# Patient Record
Sex: FEMALE | Race: OTHER | NOT HISPANIC OR LATINO | Employment: UNEMPLOYED | URBAN - METROPOLITAN AREA
[De-identification: names, ages, dates, MRNs, and addresses within clinical notes are randomized per-mention and may not be internally consistent; named-entity substitution may affect disease eponyms.]

---

## 2019-11-01 LAB — POC BETA-HCG (QUAL): NEGATIVE

## 2020-02-05 ENCOUNTER — HISTORICAL (OUTPATIENT)
Dept: ADMINISTRATIVE | Facility: HOSPITAL | Age: 50
End: 2020-02-05

## 2020-02-05 LAB
ABS NEUT (OLG): 4.7 X10(3)/MCL (ref 2.1–9.2)
ALBUMIN SERPL-MCNC: 3.8 GM/DL (ref 3.4–5)
ALBUMIN/GLOB SERPL: 1.2 RATIO (ref 1.1–2)
ALP SERPL-CCNC: 69 UNIT/L (ref 45–117)
ALT SERPL-CCNC: 26 UNIT/L (ref 12–78)
AST SERPL-CCNC: 16 UNIT/L (ref 15–37)
BASOPHILS # BLD AUTO: 0 X10(3)/MCL (ref 0–0.2)
BASOPHILS NFR BLD AUTO: 1 %
BILIRUB SERPL-MCNC: 0.3 MG/DL (ref 0.2–1)
BILIRUBIN DIRECT+TOT PNL SERPL-MCNC: 0.1 MG/DL (ref 0–0.2)
BILIRUBIN DIRECT+TOT PNL SERPL-MCNC: 0.2 MG/DL
BUN SERPL-MCNC: 25 MG/DL (ref 7–18)
CALCIUM SERPL-MCNC: 8.4 MG/DL (ref 8.5–10.1)
CHLORIDE SERPL-SCNC: 107 MMOL/L (ref 98–107)
CO2 SERPL-SCNC: 29 MMOL/L (ref 21–32)
CREAT SERPL-MCNC: 0.9 MG/DL (ref 0.6–1.3)
EOSINOPHIL # BLD AUTO: 0.2 X10(3)/MCL (ref 0–0.9)
EOSINOPHIL NFR BLD AUTO: 2 %
ERYTHROCYTE [DISTWIDTH] IN BLOOD BY AUTOMATED COUNT: 12.2 % (ref 11.5–14.5)
GLOBULIN SER-MCNC: 3.3 GM/ML (ref 2.3–3.5)
GLUCOSE SERPL-MCNC: 91 MG/DL (ref 74–106)
HCT VFR BLD AUTO: 39.1 % (ref 35–46)
HGB BLD-MCNC: 12.7 GM/DL (ref 12–16)
IMM GRANULOCYTES # BLD AUTO: 0.01 10*3/UL
IMM GRANULOCYTES NFR BLD AUTO: 0 %
LYMPHOCYTES # BLD AUTO: 2.6 X10(3)/MCL (ref 0.6–4.6)
LYMPHOCYTES NFR BLD AUTO: 32 %
MCH RBC QN AUTO: 29.3 PG (ref 26–34)
MCHC RBC AUTO-ENTMCNC: 32.5 GM/DL (ref 31–37)
MCV RBC AUTO: 90.1 FL (ref 80–100)
MONOCYTES # BLD AUTO: 0.6 X10(3)/MCL (ref 0.1–1.3)
MONOCYTES NFR BLD AUTO: 8 %
NEUTROPHILS # BLD AUTO: 4.7 X10(3)/MCL (ref 2.1–9.2)
NEUTROPHILS NFR BLD AUTO: 58 %
PLATELET # BLD AUTO: 228 X10(3)/MCL (ref 130–400)
PMV BLD AUTO: 9.9 FL (ref 7.4–10.4)
POTASSIUM SERPL-SCNC: 3.7 MMOL/L (ref 3.5–5.1)
PROT SERPL-MCNC: 7.1 GM/DL (ref 6.4–8.2)
RBC # BLD AUTO: 4.34 X10(6)/MCL (ref 4–5.2)
SODIUM SERPL-SCNC: 140 MMOL/L (ref 136–145)
WBC # SPEC AUTO: 8.2 X10(3)/MCL (ref 4.5–11)

## 2020-02-11 ENCOUNTER — HISTORICAL (OUTPATIENT)
Dept: ADMINISTRATIVE | Facility: HOSPITAL | Age: 50
End: 2020-02-11

## 2020-02-13 ENCOUNTER — HISTORICAL (OUTPATIENT)
Dept: SURGERY | Facility: HOSPITAL | Age: 50
End: 2020-02-13

## 2022-04-11 ENCOUNTER — HISTORICAL (OUTPATIENT)
Dept: ADMINISTRATIVE | Facility: HOSPITAL | Age: 52
End: 2022-04-11

## 2022-04-28 VITALS
WEIGHT: 180.13 LBS | DIASTOLIC BLOOD PRESSURE: 77 MMHG | SYSTOLIC BLOOD PRESSURE: 138 MMHG | BODY MASS INDEX: 35.37 KG/M2 | HEIGHT: 60 IN | OXYGEN SATURATION: 98 %

## 2022-05-04 NOTE — HISTORICAL [facility] CERNER
This is a historical note converted from [employer]ner. Formatting and pictures may have been removed.  Please reference [employer] for original formatting and attached multimedia. Indication for Surgery  s/p LEEP with HSIL at the margins  Preoperative Diagnosis  Cervical Dysplasia  Postoperative Diagnosis  Same as above  Operation  Cold Knife Cone  Surgeon(s)  MD GUERA Sesay MD - Attending  Anesthesia  General Endotracheal  Estimated Blood Loss  5 mL  Urine Output  20 mL  ?  IVFs  300 mL  Findings  EUA: Normal external female genitalia. Moist pink vaginal mucosa. Normal appearing?cervix without lesions or masses. Paragard IUD strings noted?in the os.?Lugols solution was placed on the cervix and no lesions were visualized. 8 week size anteverted uterus, mobile, good descent, adequate vaginal capacity. No adnexal masses palpated.?  Specimen(s)  Conization of the Cervix  Endocervical Curettage  Complications  None  Technique  The patient was then brought to the operating room with IVFs running and SCDs in place. General anesthesia was obtained and found to be adequate. The patient was then prepped and draped in the normal sterile fashion in the dorsal supine position with Ravinder type stirrups.?  The cervix was visualized. Lugols solution was placed on the cervix and no lesions were visualized. The anterior lip of the cervix was then grasped with a single-tooth tenaculum. A paracervical block was performed using?8 mL of vasopressin placed 4:00 and 8:00. Two stay sutures were placed using 0 Vicryl at the 3 and 9 oclock positions. A hollow?spinal needle cap was placed over the IUD strings for protection. An 11 blade was used to obtain the cone specimen starting at the 6 oclock position and moving in a counter-clockwise manner.  The cervical cone specimen was then amputated and tagged with silk suture at the?6 oclock position. At this point an endocervical curettage was performed. Both specimens were then  labeled and sent for pathology. Hemostasis was achieved using the Bovie with a roller ball tip.?IUD strings remained visible in the os. Monsels was then placed in the surgical bed, over which the two stay sutures were tied. The patient tolerated the procedure well. The patient was then extubated and awakened from anesthesia and brought to the recovery room in stable condition.  ?  ?Javier?was present for the entire procedure.  ?   Hunter Mack MD PGY2?  [facility]?   This certifies I was present during the entire period between opening and closing of the surgical field.

## 2022-05-04 NOTE — HISTORICAL [facility] CERNER
This is a historical note converted from [employer]. Formatting and pictures may have been removed.  Please reference Kamran for original formatting and attached multimedia. H&P Interval?Update  ?   50?yo  presents for scheduledcold knife cervical conization secondary to cervical dysplasia.  ?   She is able to describe planned procedure in her own words.  No new medical problems or medications.  Last ate or drank at 1600 yesterday  ?   After procedure we have her permission to contact?her Daughter Suyapa Guaman?at [phone]  ?  ?Event Name? Event Result? Date/Time?   Temperature Oral 36.8 DegC 20 09:00:00   Peripheral Pulse Rate 65 bpm 20 10:00:00   Peripheral Pulse Rate 68 bpm 20 09:00:00   Respiratory Rate 18 br/min 20 10:00:00   Respiratory Rate 20 br/min 20 09:00:00   SpO2 98 % 20 10:00:00   SpO2 98 % 20 09:00:00   Systolic Blood Pressure 142 mmHg?High 20 10:00:00   Systolic Blood Pressure 133 mmHg 20 09:00:00   Diastolic Blood Pressure 73 mmHg 20 10:00:00   Diastolic Blood Pressure 79 mmHg 20 09:00:00   Mean Arterial Pressure, Cuff 96 mmHg 20 10:00:00   Mean Arterial Pressure, Cuff 97 mmHg 20 09:00:00   ?  GEN: NAD, A&O  Card: RRR  Pulm: CTAB  Abdomen: Soft, non-tender, no rebound or guarding  Ext: Calves?non-tender bilaterally  ?   UPT Negative  ?   Hunter Mack, HO-II  [facility]  ?  Chief Complaint  Preop  ?  History of Present Illness  50?yo  presents for preop for cold knife cervical conization secondary to cervical dysplasia. Patient had high grade dysplasia with positive margins after LEEP.? She missed her?follow-up visit to discuss the results of her LEEP?because the phone number in power chart?was not?accurate.? She has no other complaints today.? She is sexually active, and has a ParaGard IUD?for contraception.  ?  OBHx: FT  x3  GYNHx: LMP 2019, 13/reg/3, denies h/o STIs  SocialHx: Clean houses. Denies  alcohol, tobacco and illicit drugs  Review of Systems  Positive in Bold  General ROS: chills, fatigue or fever; no dizziness, weakness and fatigue  Psychological ROS: anxiety, behavioral disorder or concentration difficulties, depression  Endocrine ROS: breast changes, hot flashes or mood swings  Breast ROS: nipple changes or nipple discharge  Respiratory ROS: cough, hemoptysis or orthopnea  Cardiovascular ROS: edema, irregular heartbeat or murmur  Gastrointestinal ROS: abdominal pain, appetite loss, blood in stools, constipation or diarrhea  Genito-Urinary ROS: genital discharge, genital ulcers or hematuria, + pressure, abnormal uterine bleeding, dysmenorrhea  Musculoskeletal ROS: gait disturbance  Neurological ROS: confusion or dizziness  Dermatological ROS: acne, dry skin and eczema  ?  Physical Exam  ???Vitals & Measurements  ??  ??T: 36 HR: 97 BP: 164/97 SPO2: 99% on RA  General: NAD, A/Ox3. Well appearing.  Respiratory: CTAB, no wheezes, rales, or rhonchi  Cardiovascular: RRR no murmurs, rubs, or gallops  Abdomen: soft, nondistended, nontender to palpation, no hepatosplenomegaly, no masses palpated, normoactive bowel sounds  Extremities: no edema, no calf tenderness  External genitalia: Normal female anatomy, no masses/lesions. Normal appearing urethral meatus. Normal appearing external anus.  Speculum exam: vaginal mucosa normal in appearance. Pink. No masses/lesions. Cervix well visualized with white IUD strings through os, smooth in contour no masses or lesions. Os normal in appearance, no blood or discharge coming from the os.  Bimanual exam: Vagina with good capacity. Uterus 8cm in size, no cervical motion tenderness. Smooth in contour, no masses. Good descent, mobile, not broad. No adnexal fullness/tenderness. Normal urethra. Normal bladder.  Assessment/Plan  ?  50?yo?with?high grade cervical dysplasia  ?  Counseling:  ?  Discussed results of LEEP with patient.? Counseled her that she  has?high-grade?premalignant?cells?extending to the surgical margins of the?removed surgical tissue.? Therefore I recommend proceeding with cold knife conization?for?more precise surgical margins. We discussed the risks of Cold Knife Conization including but are not limited to visceral or vascular injury, including injury to bowel and bladder, postoperative pain, bleeding including hemorrhage requiring transfusion, and infection. She agrees to blood transfusion in case of health or life threatening blood loss. She understands that there is an increased risk to future attempts at conception/pregnancy of cervical stenosis, cervical incompetence leading to  labor or  premature rupture of membranes. We discussed that there is the risk that not all of the abnormal cells are removed, and the possible need for repeat excision or hysterectomy if repeat excision is not feasible. She is ultimately interested in a hysterectomy, but she understands that?we must complete a CKC first.  ?  [facility] appointment requested.  Labs today: CBC, CMP  Labs DOS: T&S, UPT  Meds DOS: None  Caprini score?3; SCDs for DVT prophylaxis  No abx  Discussed outpatient surgery  ?   #[id number] used for entire visit  ?  To OR for?cold knife conization on 20.  ?  Oziel Schuster, HO-III  [facility] Problem List/Past Medical History  Ongoing  ??HTN (hypertension)  ?Obesity  Procedure/Surgical History  ?none   ?  Medications  ?hydroCHLOROthiazide 12.5 mg oral capsule, 12.5 mg= 1 cap(s), Oral, Daily  ?lisinopril 20 mg oral tablet, 20 mg= 1 tab(s), Oral, Daily  Allergies  No Known Allergies  ?Marital Status  ?Single  Family History  ?Diabetes mellitus type 1.: Mother.  ?Hypertension.: Mother.  ?  Lab Results  ?  Final Diagnosis?(Verified) 2014  NEGATIVE FOR INTRAEPITHELIAL LESION OR MALIGNANCY. NIL  Additional Findings 1?(Verified)  Endometrial cells present with no LMP history in a woman 40 and over.  Additional Findings  2?(Verified)  Bacteria, blood, ?and PMNs present.  ?  Colpo 11/2019  Final Diagnosis?(Verified)  ?  1. Endocervix, Curettage:  - Rare fragment of squamous epithelium with high grade dysplasia admixed with multiple fragments of benign endocervical tissue.  ?  2. Endometrium, Biopsy:  - Benign endometrium with progestin effect..  - No evidence of atypia or malignancy.  ?  LEEP 12/2019  Final Diagnosis?(Verified)  ?  1. Cervix, Posterior pass:?  - High grade (severe) squamous dysplasia with endocervical glandular extension.  - The high grade dysplasia is present at the surgical margin.  ?  2. Cervix, Anterior pass:  - High grade (severe) squamous dysplasia with endocervical glandular extension.  - The high grade dysplasia is present at the surgical margin. [1]  ?  ?   ?  [1]?SURGICAL PATH FINAL REPORT; 12/20/2019 12:44 CST  ?  Addendum by Javier WEBB, May S on February 07, 2020 10:09:42 CST (Verified)  I have seen this patient and agree with note above.   used for entire counseling with me as well.? Patient referred to financial office following this visit- I was clear that we will do this surgery regardless given risk of underlying early malignancy or risk of progression to malignancy in this setting.? She expressed understanding.? We offered to speak with her daughter and she declined- she states that she will tell her daughter the information regarding her surgery/risks, etc.  Informed consent obtained, specifically I reiterated the risks of: infection, bleeding, damage to nearby organs, deep cone with excess bleeding requiring emergent hysterectomy, return to operating room for problems, finding of malignancy, anesthetic risks, or other unpredicted risks.??  Patient able to describe planned procedure in her own words.  Expected recovery time reviewed, as well as normal postoperative course.   I have seen the patient today preoperatively and she is able to state procedure in her own words.?   used for counseling today.? She inquires if cervix will be completely removed, which we discussed it would not.? I explained that we can consider hysterectomy at a later date after pathology is finalized, and she would like this.? She states she does not want the abnormal cervical cells coming back.  ?

## 2022-09-21 ENCOUNTER — HISTORICAL (OUTPATIENT)
Dept: ADMINISTRATIVE | Facility: HOSPITAL | Age: 52
End: 2022-09-21

## 2023-06-09 ENCOUNTER — HOSPITAL ENCOUNTER (EMERGENCY)
Facility: HOSPITAL | Age: 53
Discharge: HOME | End: 2023-06-09
Attending: STUDENT IN AN ORGANIZED HEALTH CARE EDUCATION/TRAINING PROGRAM
Payer: MEDICAID

## 2023-06-09 VITALS
RESPIRATION RATE: 14 BRPM | DIASTOLIC BLOOD PRESSURE: 71 MMHG | OXYGEN SATURATION: 97 % | TEMPERATURE: 98 F | HEART RATE: 78 BPM | SYSTOLIC BLOOD PRESSURE: 133 MMHG

## 2023-06-09 DIAGNOSIS — L30.9 DERMATITIS: ICD-10-CM

## 2023-06-09 DIAGNOSIS — R42 DIZZINESS: ICD-10-CM

## 2023-06-09 DIAGNOSIS — N39.0 URINARY TRACT INFECTION WITHOUT HEMATURIA, SITE UNSPECIFIED: Primary | ICD-10-CM

## 2023-06-09 LAB
ALBUMIN SERPL-MCNC: 4.1 G/DL (ref 3.5–5)
ALBUMIN/GLOB SERPL: 1.4 RATIO (ref 1.1–2)
ALP SERPL-CCNC: 57 UNIT/L (ref 40–150)
ALT SERPL-CCNC: 28 UNIT/L (ref 0–55)
APPEARANCE UR: CLEAR
AST SERPL-CCNC: 17 UNIT/L (ref 5–34)
BACTERIA #/AREA URNS AUTO: ABNORMAL /HPF
BASOPHILS # BLD AUTO: 0.05 X10(3)/MCL
BASOPHILS NFR BLD AUTO: 0.8 %
BILIRUB UR QL STRIP.AUTO: NEGATIVE MG/DL
BILIRUBIN DIRECT+TOT PNL SERPL-MCNC: 0.2 MG/DL
BUN SERPL-MCNC: 23.7 MG/DL (ref 9.8–20.1)
CALCIUM SERPL-MCNC: 9.6 MG/DL (ref 8.4–10.2)
CHLORIDE SERPL-SCNC: 104 MMOL/L (ref 98–107)
CO2 SERPL-SCNC: 29 MMOL/L (ref 22–29)
COLOR UR: YELLOW
CREAT SERPL-MCNC: 0.99 MG/DL (ref 0.55–1.02)
EOSINOPHIL # BLD AUTO: 0.16 X10(3)/MCL (ref 0–0.9)
EOSINOPHIL NFR BLD AUTO: 2.4 %
ERYTHROCYTE [DISTWIDTH] IN BLOOD BY AUTOMATED COUNT: 11.9 % (ref 11.5–17)
GFR SERPLBLD CREATININE-BSD FMLA CKD-EPI: >60 MLS/MIN/1.73/M2
GLOBULIN SER-MCNC: 3 GM/DL (ref 2.4–3.5)
GLUCOSE SERPL-MCNC: 290 MG/DL (ref 74–100)
GLUCOSE UR QL STRIP.AUTO: ABNORMAL MG/DL
HCT VFR BLD AUTO: 39.2 % (ref 37–47)
HGB BLD-MCNC: 13 G/DL (ref 12–16)
IMM GRANULOCYTES # BLD AUTO: 0.01 X10(3)/MCL (ref 0–0.04)
IMM GRANULOCYTES NFR BLD AUTO: 0.2 %
KETONES UR QL STRIP.AUTO: NEGATIVE MG/DL
LEUKOCYTE ESTERASE UR QL STRIP.AUTO: ABNORMAL UNIT/L
LYMPHOCYTES # BLD AUTO: 2.75 X10(3)/MCL (ref 0.6–4.6)
LYMPHOCYTES NFR BLD AUTO: 42 %
MCH RBC QN AUTO: 28.3 PG (ref 27–31)
MCHC RBC AUTO-ENTMCNC: 33.2 G/DL (ref 33–36)
MCV RBC AUTO: 85.2 FL (ref 80–94)
MONOCYTES # BLD AUTO: 0.5 X10(3)/MCL (ref 0.1–1.3)
MONOCYTES NFR BLD AUTO: 7.6 %
NEUTROPHILS # BLD AUTO: 3.07 X10(3)/MCL (ref 2.1–9.2)
NEUTROPHILS NFR BLD AUTO: 47 %
NITRITE UR QL STRIP.AUTO: NEGATIVE
NRBC BLD AUTO-RTO: 0 %
PH UR STRIP.AUTO: 6 [PH]
PLATELET # BLD AUTO: 321 X10(3)/MCL (ref 130–400)
PMV BLD AUTO: 10.4 FL (ref 7.4–10.4)
POTASSIUM SERPL-SCNC: 4 MMOL/L (ref 3.5–5.1)
PROT SERPL-MCNC: 7.1 GM/DL (ref 6.4–8.3)
PROT UR QL STRIP.AUTO: NEGATIVE MG/DL
RBC # BLD AUTO: 4.6 X10(6)/MCL (ref 4.2–5.4)
RBC #/AREA URNS AUTO: <5 /HPF
RBC UR QL AUTO: NEGATIVE UNIT/L
SODIUM SERPL-SCNC: 141 MMOL/L (ref 136–145)
SP GR UR STRIP.AUTO: 1.02 (ref 1–1.03)
SQUAMOUS #/AREA URNS AUTO: <5 /HPF
TROPONIN I SERPL-MCNC: <0.01 NG/ML (ref 0–0.04)
UROBILINOGEN UR STRIP-ACNC: 0.2 MG/DL
WBC # SPEC AUTO: 6.54 X10(3)/MCL (ref 4.5–11.5)
WBC #/AREA URNS AUTO: 12 /HPF

## 2023-06-09 PROCEDURE — 81001 URINALYSIS AUTO W/SCOPE: CPT | Performed by: PHYSICIAN ASSISTANT

## 2023-06-09 PROCEDURE — 80053 COMPREHEN METABOLIC PANEL: CPT | Performed by: PHYSICIAN ASSISTANT

## 2023-06-09 PROCEDURE — 93010 EKG 12-LEAD: ICD-10-PCS | Mod: ,,, | Performed by: INTERNAL MEDICINE

## 2023-06-09 PROCEDURE — 99285 EMERGENCY DEPT VISIT HI MDM: CPT | Mod: 25

## 2023-06-09 PROCEDURE — 93005 ELECTROCARDIOGRAM TRACING: CPT

## 2023-06-09 PROCEDURE — 96372 THER/PROPH/DIAG INJ SC/IM: CPT | Performed by: NURSE PRACTITIONER

## 2023-06-09 PROCEDURE — 93010 ELECTROCARDIOGRAM REPORT: CPT | Mod: ,,, | Performed by: INTERNAL MEDICINE

## 2023-06-09 PROCEDURE — 63600175 PHARM REV CODE 636 W HCPCS: Performed by: NURSE PRACTITIONER

## 2023-06-09 PROCEDURE — 87088 URINE BACTERIA CULTURE: CPT | Performed by: PHYSICIAN ASSISTANT

## 2023-06-09 PROCEDURE — 84484 ASSAY OF TROPONIN QUANT: CPT | Performed by: PHYSICIAN ASSISTANT

## 2023-06-09 PROCEDURE — 85025 COMPLETE CBC W/AUTO DIFF WBC: CPT | Performed by: PHYSICIAN ASSISTANT

## 2023-06-09 RX ORDER — HYDROXYZINE PAMOATE 25 MG/1
25 CAPSULE ORAL EVERY 6 HOURS PRN
Qty: 28 CAPSULE | Refills: 0 | Status: SHIPPED | OUTPATIENT
Start: 2023-06-09 | End: 2023-06-16

## 2023-06-09 RX ORDER — NITROFURANTOIN 25; 75 MG/1; MG/1
100 CAPSULE ORAL 2 TIMES DAILY
Qty: 10 CAPSULE | Refills: 0 | Status: SHIPPED | OUTPATIENT
Start: 2023-06-09 | End: 2023-06-14

## 2023-06-09 RX ORDER — TRIAMCINOLONE ACETONIDE 5 MG/G
CREAM TOPICAL 2 TIMES DAILY
Qty: 15 G | Refills: 0 | Status: SHIPPED | OUTPATIENT
Start: 2023-06-09

## 2023-06-09 RX ORDER — HYDROXYZINE 50 MG/ML
50 INJECTION, SOLUTION INTRAMUSCULAR
Status: COMPLETED | OUTPATIENT
Start: 2023-06-09 | End: 2023-06-09

## 2023-06-09 RX ORDER — MECLIZINE HYDROCHLORIDE 25 MG/1
25 TABLET ORAL 3 TIMES DAILY PRN
Qty: 21 TABLET | Refills: 0 | Status: SHIPPED | OUTPATIENT
Start: 2023-06-09 | End: 2023-07-13

## 2023-06-09 RX ADMIN — HYDROXYZINE HYDROCHLORIDE 50 MG: 50 INJECTION, SOLUTION INTRAMUSCULAR at 05:06

## 2023-06-09 NOTE — ED PROVIDER NOTES
Encounter Date: 6/9/2023       History     Chief Complaint   Patient presents with    Dizziness     Intermittent dizziness brought by increased movement, gets faint and blurry x 3 wks. Denies syncope, room spinning, n/v. insomnia x  a few months. Rash started last night neck chest and abdomen, possibly from fleas from stray dogs. Tried rubbing garlic on the rash w/ no relief. Pt also reports poision ivy last week on hands, subsided after garlic rub but it is returning.      See MDM    The history is provided by the patient and a relative. The history is limited by a language barrier. A  was used.   Review of patient's allergies indicates:  No Known Allergies  No past medical history on file.  No past surgical history on file.  No family history on file.     Review of Systems   Skin:  Positive for rash.   Neurological:  Positive for dizziness.   Psychiatric/Behavioral:  Positive for sleep disturbance.    All other systems reviewed and are negative.    Physical Exam     Initial Vitals [06/09/23 1230]   BP Pulse Resp Temp SpO2   (!) 142/72 86 14 97.9 °F (36.6 °C) 96 %      MAP       --         Physical Exam    Nursing note and vitals reviewed.  Constitutional: She appears well-developed and well-nourished.   HENT:   Right Ear: Tympanic membrane and ear canal normal.   Left Ear: Tympanic membrane and ear canal normal.   Eyes: Conjunctivae and EOM are normal. Pupils are equal, round, and reactive to light.   Cardiovascular:  Normal rate, regular rhythm, normal heart sounds and intact distal pulses.           Pulmonary/Chest: Breath sounds normal. No respiratory distress.     Neurological: She is alert and oriented to person, place, and time. She has normal strength.   Skin: Skin is warm and dry. Rash noted. Rash is papular.        Rash to neck area, appears to be dermatitis   Psychiatric: She has a normal mood and affect.       ED Course   Procedures  Labs Reviewed   COMPREHENSIVE METABOLIC PANEL -  Abnormal; Notable for the following components:       Result Value    Glucose Level 290 (*)     Blood Urea Nitrogen 23.7 (*)     All other components within normal limits   URINALYSIS, REFLEX TO URINE CULTURE - Abnormal; Notable for the following components:    Glucose, UA 2+ (*)     Leukocyte Esterase, UA Trace (*)     All other components within normal limits   URINALYSIS, MICROSCOPIC - Abnormal; Notable for the following components:    WBC, UA 12 (*)     Bacteria, UA 1+ (*)     All other components within normal limits   TROPONIN I - Normal   CULTURE, URINE   CBC W/ AUTO DIFFERENTIAL    Narrative:     The following orders were created for panel order CBC auto differential.  Procedure                               Abnormality         Status                     ---------                               -----------         ------                     CBC with Differential[[id number]]                            Final result                 Please view results for these tests on the individual orders.   CBC WITH DIFFERENTIAL     EKG Readings: (Independently Interpreted)   Initial Reading: No STEMI. Rhythm: Normal Sinus Rhythm. Heart Rate: 81. Ectopy: No Ectopy. Conduction: Normal. ST Segments: Normal ST Segments. T Waves: Normal. Clinical Impression: Normal Sinus Rhythm     Imaging Results              CT Head Without Contrast (Final result)  Result time 06/09/23 16:47:06      Final result by Amber Laird MD (06/09/23 16:47:06)                   Impression:      Mucosal thickening in the maxillary sinus on the right    Otherwise unremarkable      Electronically signed by: Amber Laird  Date:    06/09/2023  Time:    16:47               Narrative:    EXAMINATION:  CT HEAD WITHOUT CONTRAST    CLINICAL HISTORY:  Transient ischemic attack (TIA);    TECHNIQUE:  Multiple axial images were obtained from the base of the brain to the vertex without contrast administration.  Sagittal and coronal reconstructions were  performed. .Automatic exposure control  (AEC) is utilized to reduce patient radiation exposure.    COMPARISON:  None    FINDINGS:  There is no intracranial mass or lesion seen.  No hemorrhage is seen.  No infarct is seen.  The ventricles and basilar cisterns appear normal.  Brain parenchyma appears grossly unremarkable.    Posterior fossa appears normal.  The calvarium is intact.  There is mucosal thickening in the right maxillary sinus..                                       Medications   hydrOXYzine injection 50 mg (50 mg Intramuscular Given 6/9/23 1759)     Medical Decision Making:   History:   I obtained history from: someone other than patient.       <> Summary of History: Patient's daughter translates for the patient and provides a lot of history.  She states that the patient has not been sleeping very much at all for the past 3 months she gets maybe 2-3 hours of sleep per night.  She has tried melatonin without any relief.  Also notes a 3 week history of dizziness when she is doing a lot of work or walking around and doing light activity in the house.  She states that when she gets this dizziness things around the room starts to move.  She states that she drinks a lot of water no nausea no vomiting no change in her vision currently but states sometimes when she gets the dizziness her vision gets a little bit blurry.  Also notes a rash the anterior aspect of her neck and some on her hands she did have poison ivy about a week ago which she use some home remedies and thought that it cleared up but it seems to have returned and it is itching a lot  Differential Diagnosis:   Includes but not limited to poison ivy, dermatitis, dehydration, UTI, stemi, nstemi, stroke  Independently Interpreted Test(s):   I have ordered and independently interpreted X-rays - see prior notes.  Clinical Tests:   Lab Tests: Ordered and Reviewed  The following lab test(s) were unremarkable: CBC, CMP, Urinalysis and Troponin       <>  Summary of Lab: Unremarkable for acute findings.  Tropes negative.  No leukocytosis no anemia.  Urinalysis does have some bacteria white blood cells and leukocytes so will treat for urinary tract infection  Radiological Study: Ordered and Reviewed  Medical Tests: Ordered and Reviewed  ED Management:  patient has not been sleeping very much at all for the past 3 months she gets maybe 2-3 hours of sleep per night.  She has tried melatonin without any relief.  Also notes a 3 week history of dizziness when she is doing a lot of work or walking around and doing light activity in the house.  She states that when she gets this dizziness things around the room starts to move.  She states that she drinks a lot of water no nausea no vomiting no change in her vision currently but states sometimes when she gets the dizziness her vision gets a little bit blurry.  Also notes a rash the anterior aspect of her neck and some on her hands she did have poison ivy about a week ago which she use some home remedies and thought that it cleared up but it seems to have returned and it is itching a lot    EKG no acute findings, CT head is negative for any acute findings it does show that she has maxillary sinus mucosal thickening on the right side but otherwise is unremarkable, lab work is unremarkable for any acute findings with a negative troponin no anemia or leukocytosis her glucose is elevated at 290 which she does admit that she ate pancakes prior to coming here today she is diabetic and she does monitor her sugars closely at home and states they are usually .  Her urine does have signs of a mild urinary tract infection with some bacteria leukocytes and WBCs without any squamous so we will go ahead and put her on some antibiotics being that she is having this dizziness along with her being diabetic as a precaution.  Discussed finding primary care provider who she is satisfied with an order to further work this up on an outpatient  basis.  Will try meclizine for the episodes of dizziness as she states that the items move around her and only happens when she is moving around a lot.  We will also give her Vistaril for the itching and hoped that this will help her rest and sleep and gave her a topical steroid cream to apply to the anterior neck area.                        Clinical Impression:   Final diagnoses:  [R42] Dizziness  [N39.0] Urinary tract infection without hematuria, site unspecified (Primary)  [L30.9] Dermatitis        ED Disposition Condition    Discharge Stable          ED Prescriptions       Medication Sig Dispense Start Date End Date Auth. Provider    triamcinolone acetonide 0.5% (KENALOG) 0.5 % Crea Apply topically 2 (two) times daily. 15 g 6/9/2023 -- CHRISSIE Andersen    hydrOXYzine pamoate (VISTARIL) 25 MG Cap Take 1 capsule (25 mg total) by mouth every 6 (six) hours as needed (itching, sleep). 28 capsule 6/9/2023 6/16/2023 CHRISSIE Andersen    meclizine (ANTIVERT) 25 mg tablet Take 1 tablet (25 mg total) by mouth 3 (three) times daily as needed for Dizziness. 21 tablet 6/9/2023 6/16/2023 CHRISSIE Andersen    nitrofurantoin, macrocrystal-monohydrate, (MACROBID) 100 MG capsule Take 1 capsule (100 mg total) by mouth 2 (two) times daily. for 5 days 10 capsule 6/9/2023 6/14/2023 CHRISSIE Andersen          Follow-up Information       Follow up With Specialties Details Why Contact Info    Chao Guevara MD Family Medicine   [address] [place] [id number] [phone]      primary care provider  Call  [phone]              CHRISSIE Andersen  06/09/23 5141

## 2023-06-09 NOTE — DISCHARGE INSTRUCTIONS
Take macrobid for urine infection for 5 days. Hydrate with lots of water. Meclizine only as needed when the dizziness episode happens. Vistaril for itching as well as for sleep. Kenalog cream topically to the rash. Avoid itching or rubbing. Hot water and getting over heated will make the rash worse.     Call [phone] for primary care follow up/establishment.     If symptoms change/worsen, return to ER.

## 2023-06-11 LAB — BACTERIA UR CULT: NORMAL

## 2023-07-13 ENCOUNTER — OFFICE VISIT (OUTPATIENT)
Dept: INTERNAL MEDICINE | Facility: CLINIC | Age: 53
End: 2023-07-13
Payer: MEDICAID

## 2023-07-13 VITALS
SYSTOLIC BLOOD PRESSURE: 135 MMHG | HEART RATE: 68 BPM | OXYGEN SATURATION: 98 % | BODY MASS INDEX: 35.35 KG/M2 | TEMPERATURE: 98 F | WEIGHT: 181 LBS | RESPIRATION RATE: 18 BRPM | DIASTOLIC BLOOD PRESSURE: 82 MMHG

## 2023-07-13 DIAGNOSIS — I10 HYPERTENSION, UNSPECIFIED TYPE: ICD-10-CM

## 2023-07-13 DIAGNOSIS — R42 DIZZINESS: ICD-10-CM

## 2023-07-13 DIAGNOSIS — Z12.12 SCREENING FOR COLORECTAL CANCER: ICD-10-CM

## 2023-07-13 DIAGNOSIS — E11.9 TYPE 2 DIABETES MELLITUS WITHOUT COMPLICATION, WITHOUT LONG-TERM CURRENT USE OF INSULIN: ICD-10-CM

## 2023-07-13 DIAGNOSIS — Z12.11 SCREENING FOR COLORECTAL CANCER: ICD-10-CM

## 2023-07-13 DIAGNOSIS — Z12.39 ENCOUNTER FOR SCREENING FOR MALIGNANT NEOPLASM OF BREAST, UNSPECIFIED SCREENING MODALITY: Primary | ICD-10-CM

## 2023-07-13 DIAGNOSIS — Z78.0 MENOPAUSE: ICD-10-CM

## 2023-07-13 DIAGNOSIS — E78.5 HYPERLIPIDEMIA, UNSPECIFIED HYPERLIPIDEMIA TYPE: ICD-10-CM

## 2023-07-13 DIAGNOSIS — Z23 NEED FOR VACCINATION: ICD-10-CM

## 2023-07-13 DIAGNOSIS — Z00.00 HEALTHCARE MAINTENANCE: ICD-10-CM

## 2023-07-13 DIAGNOSIS — R23.2 HOT FLASHES: ICD-10-CM

## 2023-07-13 PROCEDURE — 90471 IMMUNIZATION ADMIN: CPT | Mod: PBBFAC

## 2023-07-13 PROCEDURE — 90472 IMMUNIZATION ADMIN EACH ADD: CPT | Mod: PBBFAC

## 2023-07-13 PROCEDURE — 90715 TDAP VACCINE 7 YRS/> IM: CPT | Mod: PBBFAC

## 2023-07-13 PROCEDURE — 99214 OFFICE O/P EST MOD 30 MIN: CPT | Mod: PBBFAC | Performed by: STUDENT IN AN ORGANIZED HEALTH CARE EDUCATION/TRAINING PROGRAM

## 2023-07-13 RX ORDER — LISINOPRIL 40 MG/1
40 TABLET ORAL DAILY
COMMUNITY
Start: 2023-06-06 | End: 2023-07-13 | Stop reason: SDUPTHER

## 2023-07-13 RX ORDER — GLIPIZIDE 5 MG/1
5 TABLET ORAL 2 TIMES DAILY
Qty: 60 TABLET | Refills: 11 | Status: SHIPPED | OUTPATIENT
Start: 2023-07-13 | End: 2023-11-27 | Stop reason: SDUPTHER

## 2023-07-13 RX ORDER — HYDROCHLOROTHIAZIDE 25 MG/1
25 TABLET ORAL DAILY
Qty: 30 TABLET | Refills: 11 | Status: SHIPPED | OUTPATIENT
Start: 2023-07-13 | End: 2023-11-27 | Stop reason: SDUPTHER

## 2023-07-13 RX ORDER — LISINOPRIL 40 MG/1
40 TABLET ORAL DAILY
Qty: 30 TABLET | Refills: 11 | Status: SHIPPED | OUTPATIENT
Start: 2023-07-13 | End: 2023-11-27 | Stop reason: SDUPTHER

## 2023-07-13 RX ORDER — GLIPIZIDE 5 MG/1
5 TABLET ORAL 2 TIMES DAILY
COMMUNITY
Start: 2023-06-06 | End: 2023-07-13 | Stop reason: SDUPTHER

## 2023-07-13 RX ORDER — HYDROCHLOROTHIAZIDE 25 MG/1
25 TABLET ORAL DAILY
COMMUNITY
Start: 2023-06-06 | End: 2023-07-13 | Stop reason: SDUPTHER

## 2023-07-13 RX ORDER — AMLODIPINE BESYLATE 10 MG/1
10 TABLET ORAL DAILY
Qty: 30 TABLET | Refills: 11 | Status: SHIPPED | OUTPATIENT
Start: 2023-07-13 | End: 2023-11-27 | Stop reason: SDUPTHER

## 2023-07-13 RX ORDER — LANCETS 33 GAUGE
1 EACH MISCELLANEOUS 2 TIMES DAILY
COMMUNITY
Start: 2023-02-02 | End: 2023-11-27 | Stop reason: SDUPTHER

## 2023-07-13 RX ORDER — AMLODIPINE BESYLATE 10 MG/1
10 TABLET ORAL DAILY
COMMUNITY
Start: 2023-05-30 | End: 2023-07-13 | Stop reason: SDUPTHER

## 2023-07-13 RX ORDER — BLOOD SUGAR DIAGNOSTIC
1 STRIP MISCELLANEOUS 2 TIMES DAILY
COMMUNITY
Start: 2023-03-06 | End: 2023-11-27 | Stop reason: SDUPTHER

## 2023-07-13 RX ORDER — FENOFIBRATE 145 MG/1
145 TABLET, FILM COATED ORAL DAILY
Qty: 30 TABLET | Refills: 11 | Status: SHIPPED | OUTPATIENT
Start: 2023-07-13 | End: 2023-11-27 | Stop reason: SDUPTHER

## 2023-07-13 RX ORDER — LANCETS 30 GAUGE
1 EACH MISCELLANEOUS 2 TIMES DAILY
COMMUNITY
Start: 2023-03-06

## 2023-07-13 RX ORDER — FENOFIBRATE 145 MG/1
145 TABLET, FILM COATED ORAL DAILY
COMMUNITY
Start: 2023-06-06 | End: 2023-07-13 | Stop reason: SDUPTHER

## 2023-07-13 NOTE — PROGRESS NOTES
"[facility] INTERNAL MEDICINE  OUTPATIENT OFFICE VISIT NOTE    SUBJECTIVE:      Chief Complaint: Establish Care (Medication Refills)       HPI: Sylwia Kirk is a 53 y.o. yo female w/ PMH of T2DM, HTN, HLD, and menopause, who presents to establish care.    Today, patient reports skin rash and UTI have resolved. States last menstrual cycle was in December 2022. Hot flashes, night sweats, insomnia, dizziness ever since.    Past Medical History:  HTN, T2DM, HLD    Past Surgical History:  Colposcopy    Family History:  DM, HTN - mother, DM - siblings    Social History:  Tobacco use: Never  Alcohol use: Never  Drug use: Never    Allergies:  has No Known Allergies.     ROS:  (+) Hot flashes, poor sleep, night sweats, dizziness  (-) Headache, chest pain, shortness of breath, edema, nausea, abdominal pain, diarrhea, consitpation     OBJECTIVE:     Vital signs:   /82 (BP Location: Right arm, Patient Position: Sitting, BP Method: Large (Automatic))   Pulse 68   Temp 98.1 °F (36.7 °C) (Oral)   Resp 18   Wt 82.1 kg (181 lb)   LMP 12/02/2022   SpO2 98%   BMI 35.35 kg/m²      Physical Examination:  General: Patient resting comfortably, in no acute distress   HEENT: Head-normocephalic and atraumatic, normal TM  Respiratory: clear to auscultation bilaterally  Cardiovascular: regular rate without murmurs  Gastrointestinal: soft, non-tender, non-distended   Musculoskeletal: full range of motion without limitation or discomfort  Integumentary: no rashes or skin lesions present  Neurologic: no signs of peripheral neurological deficit  Psychiatric:  alert and oriented, cognitive function intact, cooperative with exam      ASSESSMENT & PLAN:       Type 2 diabetes mellitus   - Check A1c  - Continue glipizide 5 mg BID  - Patient previously on "high dose" of metformin which was discontinue due to nausea, would like to discontinue glipizide and switch to lower dose metformin, will discuss after labs at next visit    Hypertension - " well controlled  - /82  - Continue lisinopril 40 mg daily, HCTZ 25 mg daily, amlodipine 10mg daily  - US Renal artery, renin, aldosterone ordered 7/2023    Vertigo  - Continue meclizine 25 mg TID PRN  - Orthostatics normal 7/2023  - Order ECHO 7/2023    Menopause  Hot flashes  Dizziness  - Refer to GYN  - Obtain FSH, LH 7/2023    Healthcare maintenance   labs (CBC, CMP, FLP, A1c, TSH, vitD): Ordered 7/2023  HIV/Hep panel/syphilis: Ordered 7/2023  Pneumococcal vaccine: at 65  Tdap: UTD 7/2023  Zoster vaccine: Dose 1 7/2023,   Flu: next season  FIT/colonoscopy: Cologuard ordered 7/2023  Lung cancer screening (LDCT age 50-80): N/A  Mammogram: Ordered 7/2023  DEXA scan: 2 years after menopause in 12/2024  GYN (pap smears): referral sent 7/2023      Return to clinic in 3 months.      Bruna Phelps MD  [facility], -2  7/13/2023

## 2023-07-19 ENCOUNTER — TELEPHONE (OUTPATIENT)
Dept: INTERNAL MEDICINE | Facility: CLINIC | Age: 53
End: 2023-07-19
Payer: MEDICAID

## 2023-07-19 NOTE — TELEPHONE ENCOUNTER
----- Message from Tia Mccauley sent at 7/19/2023 10:50 AM CDT -----  Regarding: Dr. Phelps-Sleep Medication  Good morning, daughter of this patient states she has three voice mails in regards to sleep medication Dr. Phelps was going to send to [facility] pharmacy for her mom. Daughter was upset stating it's been over 72 hours and no one has responded to her messages. LOV was on 07/13/23. She is requesting a call back @ [phone] once  the sleep medication has been ordered. Thank you

## 2023-07-19 NOTE — TELEPHONE ENCOUNTER
Pt called, no answer message left informed pt/daughter to called, [facility] [place] at [phone] at her earliest convenience in reference to sleep aid. Call ended. Please review and advise if you are interested in Rx pt an sleep aid or if pt should try an OTC sleep aid. Thanks

## 2023-07-20 NOTE — TELEPHONE ENCOUNTER
Placed call to patient x2 attempts. No answer. Patient advised via voicemail to contact clinic at earliest convenience.

## 2023-07-21 NOTE — TELEPHONE ENCOUNTER
3rd attempt-Placed call to patient x2 attempts. No answer. Patient advised via voicemail to contact clinic at earliest convenience.  Patient letter mailed to patient at address listed in chart

## 2023-07-24 ENCOUNTER — TELEPHONE (OUTPATIENT)
Dept: INTERNAL MEDICINE | Facility: CLINIC | Age: 53
End: 2023-07-24
Payer: MEDICAID

## 2023-07-24 DIAGNOSIS — I10 HYPERTENSION, UNSPECIFIED TYPE: Primary | ICD-10-CM

## 2023-07-24 NOTE — TELEPHONE ENCOUNTER
Contacted patient ID and  verified by Olive, patient's daughter. Patient's daughter informed of provider's recommendation to take Melatonin 6mg as needed for insomnia. Patient's daughter stated patient tried OTC melatonin and it was not effective. Patient's daughter requesting a prescribe medication to help treat insomnia. Please review and advise

## 2023-07-24 NOTE — TELEPHONE ENCOUNTER
----- Message from Darya Alston sent at 7/21/2023  3:53 PM CDT -----  Regarding: CV US Renal Artery Stenosis Hypertension Limited  Good afternoon,    We are unable to schedule the order CV US Renal Artery Stenosis Hypertension Limited as it is ordered. This test is ordered as US Doppler Renal Artery & Vein. Please inform provider.    Thank you,

## 2023-08-09 ENCOUNTER — HOSPITAL ENCOUNTER (OUTPATIENT)
Dept: RADIOLOGY | Facility: HOSPITAL | Age: 53
Discharge: HOME | End: 2023-08-09
Attending: STUDENT IN AN ORGANIZED HEALTH CARE EDUCATION/TRAINING PROGRAM
Payer: MEDICAID

## 2023-08-09 ENCOUNTER — HOSPITAL ENCOUNTER (OUTPATIENT)
Dept: CARDIOLOGY | Facility: HOSPITAL | Age: 53
Discharge: HOME | End: 2023-08-09
Attending: STUDENT IN AN ORGANIZED HEALTH CARE EDUCATION/TRAINING PROGRAM
Payer: MEDICAID

## 2023-08-09 VITALS
HEIGHT: 60 IN | WEIGHT: 181 LBS | BODY MASS INDEX: 35.53 KG/M2 | SYSTOLIC BLOOD PRESSURE: 138 MMHG | DIASTOLIC BLOOD PRESSURE: 83 MMHG

## 2023-08-09 DIAGNOSIS — R42 DIZZINESS: ICD-10-CM

## 2023-08-09 DIAGNOSIS — Z00.00 HEALTHCARE MAINTENANCE: ICD-10-CM

## 2023-08-09 DIAGNOSIS — Z12.39 ENCOUNTER FOR SCREENING FOR MALIGNANT NEOPLASM OF BREAST, UNSPECIFIED SCREENING MODALITY: ICD-10-CM

## 2023-08-09 LAB
AV INDEX (PROSTH): 0.68
AV MEAN GRADIENT: 4 MMHG
AV PEAK GRADIENT: 8 MMHG
AV VALVE AREA BY VELOCITY RATIO: 2.23 CM²
AV VALVE AREA: 2.2 CM²
AV VELOCITY RATIO: 0.69
BSA FOR ECHO PROCEDURE: 1.86 M2
CV ECHO LV RWT: 0.47 CM
DOP CALC AO PEAK VEL: 1.41 M/S
DOP CALC AO VTI: 30.4 CM
DOP CALC LVOT AREA: 3.2 CM2
DOP CALC LVOT DIAMETER: 2.03 CM
DOP CALC LVOT PEAK VEL: 0.97 M/S
DOP CALC LVOT STROKE VOLUME: 66.96 CM3
DOP CALCLVOT PEAK VEL VTI: 20.7 CM
E WAVE DECELERATION TIME: 156.45 MSEC
E/A RATIO: 1.22
ECHO LV POSTERIOR WALL: 0.98 CM (ref 0.6–1.1)
FRACTIONAL SHORTENING: 41 % (ref 28–44)
INTERVENTRICULAR SEPTUM: 1.02 CM (ref 0.6–1.1)
IVC DIAMETER: 1.6 CM
LEFT ATRIUM SIZE: 2.79 CM
LEFT ATRIUM VOLUME INDEX MOD: 7.6 ML/M2
LEFT ATRIUM VOLUME MOD: 13.56 CM3
LEFT INTERNAL DIMENSION IN SYSTOLE: 2.49 CM (ref 2.1–4)
LEFT VENTRICLE DIASTOLIC VOLUME INDEX: 44.1 ML/M2
LEFT VENTRICLE DIASTOLIC VOLUME: 78.94 ML
LEFT VENTRICLE MASS INDEX: 77 G/M2
LEFT VENTRICLE SYSTOLIC VOLUME INDEX: 12.3 ML/M2
LEFT VENTRICLE SYSTOLIC VOLUME: 22.07 ML
LEFT VENTRICULAR INTERNAL DIMENSION IN DIASTOLE: 4.21 CM (ref 3.5–6)
LEFT VENTRICULAR MASS: 137.77 G
LV LATERAL E/E' RATIO: 9.13 M/S
LVOT MG: 2.01 MMHG
LVOT MV: 0.66 CM/S
MV PEAK A VEL: 0.6 M/S
MV PEAK E VEL: 0.73 M/S
OHS LV EJECTION FRACTION SIMPSONS BIPLANE MOD: 72 %
PISA MRMAX VEL: 3.79 M/S
PISA TR MAX VEL: 2.5 M/S
RA MAJOR: 3.9 CM
RA PRESSURE ESTIMATED: 3 MMHG
RV TB RVSP: 6 MMHG
TDI LATERAL: 0.08 M/S
TR MAX PG: 25 MMHG
TRICUSPID ANNULAR PLANE SYSTOLIC EXCURSION: 2.3 CM
TV REST PULMONARY ARTERY PRESSURE: 28 MMHG
Z-SCORE OF LEFT VENTRICULAR DIMENSION IN END DIASTOLE: -1.62
Z-SCORE OF LEFT VENTRICULAR DIMENSION IN END SYSTOLE: -1.63

## 2023-08-09 PROCEDURE — 77067 SCR MAMMO BI INCL CAD: CPT | Mod: 26,,, | Performed by: RADIOLOGY

## 2023-08-09 PROCEDURE — 77067 MAMMO DIGITAL SCREENING BILAT WITH TOMO: ICD-10-PCS | Mod: 26,,, | Performed by: RADIOLOGY

## 2023-08-09 PROCEDURE — 77063 MAMMO DIGITAL SCREENING BILAT WITH TOMO: ICD-10-PCS | Mod: 26,,, | Performed by: RADIOLOGY

## 2023-08-09 PROCEDURE — 77063 BREAST TOMOSYNTHESIS BI: CPT | Mod: 26,,, | Performed by: RADIOLOGY

## 2023-08-09 PROCEDURE — 93306 TTE W/DOPPLER COMPLETE: CPT

## 2023-08-09 PROCEDURE — 77067 SCR MAMMO BI INCL CAD: CPT | Mod: TC

## 2023-11-27 ENCOUNTER — OFFICE VISIT (OUTPATIENT)
Dept: INTERNAL MEDICINE | Facility: CLINIC | Age: 53
End: 2023-11-27
Payer: MEDICAID

## 2023-11-27 VITALS
OXYGEN SATURATION: 98 % | WEIGHT: 178.38 LBS | HEART RATE: 73 BPM | BODY MASS INDEX: 34.84 KG/M2 | TEMPERATURE: 98 F | SYSTOLIC BLOOD PRESSURE: 138 MMHG | RESPIRATION RATE: 18 BRPM | DIASTOLIC BLOOD PRESSURE: 86 MMHG

## 2023-11-27 DIAGNOSIS — Z23 NEED FOR VACCINATION: Primary | ICD-10-CM

## 2023-11-27 DIAGNOSIS — R23.2 HOT FLASHES: ICD-10-CM

## 2023-11-27 DIAGNOSIS — E78.5 HYPERLIPIDEMIA, UNSPECIFIED HYPERLIPIDEMIA TYPE: ICD-10-CM

## 2023-11-27 DIAGNOSIS — E55.9 VITAMIN D DEFICIENCY: ICD-10-CM

## 2023-11-27 DIAGNOSIS — E11.9 TYPE 2 DIABETES MELLITUS WITHOUT COMPLICATION, WITHOUT LONG-TERM CURRENT USE OF INSULIN: ICD-10-CM

## 2023-11-27 DIAGNOSIS — I10 HYPERTENSION, UNSPECIFIED TYPE: ICD-10-CM

## 2023-11-27 DIAGNOSIS — G47.00 INSOMNIA, UNSPECIFIED TYPE: ICD-10-CM

## 2023-11-27 PROBLEM — R42 DIZZINESS: Status: RESOLVED | Noted: 2023-07-13 | Resolved: 2023-11-27

## 2023-11-27 LAB — HBA1C MFR BLD: 6.4 %

## 2023-11-27 PROCEDURE — 90750 HZV VACC RECOMBINANT IM: CPT | Mod: PBBFAC

## 2023-11-27 PROCEDURE — 99213 OFFICE O/P EST LOW 20 MIN: CPT | Mod: PBBFAC | Performed by: STUDENT IN AN ORGANIZED HEALTH CARE EDUCATION/TRAINING PROGRAM

## 2023-11-27 PROCEDURE — 83036 HEMOGLOBIN GLYCOSYLATED A1C: CPT | Mod: PBBFAC | Performed by: STUDENT IN AN ORGANIZED HEALTH CARE EDUCATION/TRAINING PROGRAM

## 2023-11-27 PROCEDURE — 90471 IMMUNIZATION ADMIN: CPT | Mod: PBBFAC

## 2023-11-27 RX ORDER — HYDROCHLOROTHIAZIDE 25 MG/1
25 TABLET ORAL DAILY
Qty: 30 TABLET | Refills: 11 | Status: SHIPPED | OUTPATIENT
Start: 2023-11-27 | End: 2024-11-26

## 2023-11-27 RX ORDER — DOXEPIN HYDROCHLORIDE 10 MG/1
10 CAPSULE ORAL NIGHTLY
Qty: 30 CAPSULE | Refills: 11 | Status: SHIPPED | OUTPATIENT
Start: 2023-11-27 | End: 2024-11-26

## 2023-11-27 RX ORDER — ATORVASTATIN CALCIUM 40 MG/1
40 TABLET, FILM COATED ORAL DAILY
Qty: 90 TABLET | Refills: 3 | Status: SHIPPED | OUTPATIENT
Start: 2023-11-27 | End: 2024-11-26

## 2023-11-27 RX ORDER — BLOOD SUGAR DIAGNOSTIC
1 STRIP MISCELLANEOUS 2 TIMES DAILY
Qty: 100 STRIP | Refills: 3 | Status: SHIPPED | OUTPATIENT
Start: 2023-11-27 | End: 2024-02-22

## 2023-11-27 RX ORDER — QUETIAPINE FUMARATE 25 MG/1
25 TABLET, FILM COATED ORAL NIGHTLY
Qty: 90 TABLET | Refills: 3 | Status: SHIPPED | OUTPATIENT
Start: 2023-11-27 | End: 2023-11-27 | Stop reason: ALTCHOICE

## 2023-11-27 RX ORDER — HYDROXYZINE PAMOATE 25 MG/1
25 CAPSULE ORAL NIGHTLY PRN
Qty: 30 CAPSULE | Refills: 2 | Status: SHIPPED | OUTPATIENT
Start: 2023-11-27 | End: 2023-11-27

## 2023-11-27 RX ORDER — VIT C/E/ZN/COPPR/LUTEIN/ZEAXAN 250MG-90MG
1000 CAPSULE ORAL DAILY
Qty: 90 CAPSULE | Refills: 3 | Status: SHIPPED | OUTPATIENT
Start: 2023-11-27 | End: 2024-11-26

## 2023-11-27 RX ORDER — LISINOPRIL 40 MG/1
40 TABLET ORAL DAILY
Qty: 30 TABLET | Refills: 11 | Status: SHIPPED | OUTPATIENT
Start: 2023-11-27 | End: 2024-11-26

## 2023-11-27 RX ORDER — AMLODIPINE BESYLATE 10 MG/1
10 TABLET ORAL DAILY
Qty: 30 TABLET | Refills: 11 | Status: SHIPPED | OUTPATIENT
Start: 2023-11-27 | End: 2024-11-26

## 2023-11-27 RX ORDER — FENOFIBRATE 145 MG/1
145 TABLET, FILM COATED ORAL DAILY
Qty: 30 TABLET | Refills: 11 | Status: SHIPPED | OUTPATIENT
Start: 2023-11-27 | End: 2024-11-26

## 2023-11-27 RX ORDER — LANCETS 33 GAUGE
1 EACH MISCELLANEOUS 2 TIMES DAILY
Qty: 100 EACH | Refills: 3 | Status: SHIPPED | OUTPATIENT
Start: 2023-11-27 | End: 2024-11-26

## 2023-11-27 RX ORDER — GLIPIZIDE 5 MG/1
5 TABLET ORAL 2 TIMES DAILY
Qty: 60 TABLET | Refills: 11 | Status: SHIPPED | OUTPATIENT
Start: 2023-11-27 | End: 2024-02-22
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
## 2023-11-29 ENCOUNTER — HOSPITAL ENCOUNTER (OUTPATIENT)
Dept: RADIOLOGY | Facility: HOSPITAL | Age: 53
Discharge: HOME | End: 2023-11-29
Attending: STUDENT IN AN ORGANIZED HEALTH CARE EDUCATION/TRAINING PROGRAM
Payer: MEDICAID

## 2023-11-29 DIAGNOSIS — I10 HYPERTENSION, UNSPECIFIED TYPE: ICD-10-CM

## 2023-11-29 PROCEDURE — 93975 VASCULAR STUDY: CPT | Mod: TC

## 2023-11-30 DIAGNOSIS — I70.1 LEFT RENAL ARTERY STENOSIS: Primary | ICD-10-CM

## 2023-11-30 DIAGNOSIS — I10 HYPERTENSION, UNSPECIFIED TYPE: ICD-10-CM

## 2024-02-02 DIAGNOSIS — E11.9 TYPE 2 DIABETES MELLITUS WITHOUT COMPLICATION, WITHOUT LONG-TERM CURRENT USE OF INSULIN: Primary | ICD-10-CM

## 2024-02-20 RX ORDER — QUETIAPINE FUMARATE 25 MG/1
25 TABLET, FILM COATED ORAL NIGHTLY
COMMUNITY
Start: 2023-11-27

## 2024-02-20 RX ORDER — HYDROXYZINE PAMOATE 25 MG/1
25 CAPSULE ORAL NIGHTLY PRN
COMMUNITY
Start: 2024-01-11 | End: 2024-02-22 | Stop reason: SDUPTHER

## 2024-02-22 ENCOUNTER — CLINICAL SUPPORT (OUTPATIENT)
Dept: INTERNAL MEDICINE | Facility: CLINIC | Age: 54
End: 2024-02-22
Attending: STUDENT IN AN ORGANIZED HEALTH CARE EDUCATION/TRAINING PROGRAM
Payer: MEDICAID

## 2024-02-22 ENCOUNTER — OFFICE VISIT (OUTPATIENT)
Dept: INTERNAL MEDICINE | Facility: CLINIC | Age: 54
End: 2024-02-22
Payer: MEDICAID

## 2024-02-22 VITALS
RESPIRATION RATE: 20 BRPM | DIASTOLIC BLOOD PRESSURE: 74 MMHG | HEART RATE: 80 BPM | SYSTOLIC BLOOD PRESSURE: 124 MMHG | OXYGEN SATURATION: 96 % | TEMPERATURE: 98 F | WEIGHT: 180.81 LBS | BODY MASS INDEX: 35.31 KG/M2

## 2024-02-22 DIAGNOSIS — R51.9 WORSENING HEADACHES: ICD-10-CM

## 2024-02-22 DIAGNOSIS — I70.1 LEFT RENAL ARTERY STENOSIS: ICD-10-CM

## 2024-02-22 DIAGNOSIS — E11.9 TYPE 2 DIABETES MELLITUS WITHOUT COMPLICATION, WITHOUT LONG-TERM CURRENT USE OF INSULIN: Primary | ICD-10-CM

## 2024-02-22 DIAGNOSIS — E55.9 VITAMIN D DEFICIENCY: ICD-10-CM

## 2024-02-22 DIAGNOSIS — I10 HYPERTENSION, UNSPECIFIED TYPE: ICD-10-CM

## 2024-02-22 DIAGNOSIS — E11.9 TYPE 2 DIABETES MELLITUS WITHOUT COMPLICATION, WITHOUT LONG-TERM CURRENT USE OF INSULIN: ICD-10-CM

## 2024-02-22 LAB
LEFT EYE DM RETINOPATHY: NEGATIVE
RIGHT EYE DM RETINOPATHY: NEGATIVE

## 2024-02-22 PROCEDURE — 90677 PCV20 VACCINE IM: CPT | Mod: PBBFAC

## 2024-02-22 PROCEDURE — 90471 IMMUNIZATION ADMIN: CPT | Mod: PBBFAC

## 2024-02-22 PROCEDURE — 90686 IIV4 VACC NO PRSV 0.5 ML IM: CPT | Mod: PBBFAC

## 2024-02-22 PROCEDURE — 90472 IMMUNIZATION ADMIN EACH ADD: CPT | Mod: PBBFAC

## 2024-02-22 PROCEDURE — 99214 OFFICE O/P EST MOD 30 MIN: CPT | Mod: PBBFAC | Performed by: STUDENT IN AN ORGANIZED HEALTH CARE EDUCATION/TRAINING PROGRAM

## 2024-02-22 RX ORDER — HYDROXYZINE PAMOATE 25 MG/1
25 CAPSULE ORAL NIGHTLY PRN
Qty: 30 CAPSULE | Refills: 11 | Status: SHIPPED | OUTPATIENT
Start: 2024-02-22

## 2024-02-22 RX ORDER — METFORMIN HYDROCHLORIDE 500 MG/1
500 TABLET ORAL 2 TIMES DAILY WITH MEALS
Qty: 180 TABLET | Refills: 3 | Status: SHIPPED | OUTPATIENT
Start: 2024-02-22 | End: 2025-02-21

## 2024-02-22 RX ADMIN — PNEUMOCOCCAL 20-VALENT CONJUGATE VACCINE 0.5 ML
2.2; 2.2; 2.2; 2.2; 2.2; 2.2; 2.2; 2.2; 2.2; 2.2; 2.2; 2.2; 2.2; 2.2; 2.2; 2.2; 4.4; 2.2; 2.2; 2.2 INJECTION, SUSPENSION INTRAMUSCULAR at 02:02

## 2024-02-22 RX ADMIN — INFLUENZA VIRUS VACCINE 0.5 ML: 15; 15; 15; 15 SUSPENSION INTRAMUSCULAR at 02:02

## 2024-02-22 NOTE — PROGRESS NOTES
[facility] INTERNAL MEDICINE  OUTPATIENT OFFICE VISIT NOTE    SUBJECTIVE:      Chief Complaint: Follow-up (Medication- stop 2 medications  d/t dry mouth and headaches)       HPI: Sylwia Kirk is a 54 y.o. yo female w/ PMH of T2DM, HTN, HLD, and menopause, who presents to [facility] care.    Today, patient reports dry mouth since starting seroquel and atorvastatin. States seroquel did not help with sleep. Also reports worsening headaches with blinding pain that last 5 minutes at a time.     Past Medical History:  HTN, T2DM, HLD    Past Surgical History:  Colposcopy    Family History:  DM, HTN - mother, DM - siblings    Social History:  Tobacco use: Never  Alcohol use: Never  Drug use: Never    Allergies:  has No Known Allergies.     ROS:  (+) Poor sleep, headache  (-) chest pain, shortness of breath, edema, nausea, abdominal pain, diarrhea, consitpation     OBJECTIVE:     Vital signs:   /74 (BP Location: Left arm, Patient Position: Sitting, BP Method: Large (Automatic))   Pulse 80   Temp 98.2 °F (36.8 °C) (Oral)   Resp 20   Wt 82 kg (180 lb 12.8 oz)   SpO2 96%   BMI 35.31 kg/m²      Physical Examination:  General: Patient resting comfortably, in no acute distress   HEENT: Head-normocephalic and atraumatic  Respiratory: clear to auscultation bilaterally  Cardiovascular: regular rate without murmurs  Gastrointestinal: soft, non-tender, non-distended   Musculoskeletal: full range of motion without limitation or discomfort  Integumentary: no rashes or skin lesions present  Neurologic: no signs of peripheral neurological deficit  Psychiatric:  alert and oriented, cognitive function intact, cooperative with exam    Diabetic foot exam:   Left: Normal monofilament exam, 2+ DP, no wound/ulcer/callus  Right: Normal monofilament exam, 2+ DP, no wound/ulcer/callus    Imaging:  US Doppler Renal Artery 11/29/2023  FINDINGS:  Right kidney measures 11.2 cm. Left kidney measures 11.7 cm.  No stones or hydronephrosis.   "Circumscribed slightly echogenic focus in the right renal sinus measures 2.0 x 1.7 cm.  Renal echogenicity is normal.  Renal veins are patent.  Intrarenal arterial resistive indices are normal.  Peak systolic velocity in the abdominal aorta measures 98 cm/sec.  Peak systolic velocity in the  right renal artery measures 98 cm/sec.  Peak systolic velocity in the  left renal artery measures 201 cm/sec.  Impression:  1. Elevated peak systolic velocity in the mid left renal artery suggests some degree of hemodynamically significant stenosis.  2. Echogenic focus in the right renal fat may represent an atypical pyramid or angiomyolipoma.    ASSESSMENT & PLAN:     Type 2 diabetes mellitus   - POC A1c 6.4 in 11/2023; A1c 7.2 in 7/2023  - POC A1c  today 2/2024  - DM foot exam 2/2024  - DM eye exam ordered 2/2024 in clinic  - Discontinue glipizide 5 mg BID per patient request, no side effects but concerned about what she has read online, would prefer metformin  - Start metformin 500 mg BID  - Continue once daily glucose checks, glucose     Hypertension - well controlled  Left renal artery stenosis  - Renin 0.6, Aldosterone 4.2 in 11/2023  - US Renal 11/2023: "Elevated peak systolic velocity in the mid left renal artery suggests some degree of hemodynamically significant stenosis"  - Referred to Nephrology given renal artery stenosis, appt not yet scheduled  - Continue lisinopril 40 mg daily, HCTZ 25 mg daily, amlodipine 10mg daily    Vertigo - resolved  - Continue meclizine 25 mg TID PRN  - Orthostatics normal 7/2023  - TTE 8/2023: EF 70%, hyperdynamic systolic function, normal LV wall thcikness and motion, normal diastolic function, trace MV regurg, trace TV regurg, trace PV regurg    Menopause  Hot flashes  Dizziness  - GYN appt pending 4/2024    Insomnia  - Previously used quetiapine 25 mg nightly  - Discontinue seroquel and doxepin due to side effects of dry moth  - Continue hydroxyzine prn as it works well    Headache  - " Migraines twice in past month, before would be once every two months  - Headache for years but worse in the last month, bilateral head and pain in right posterior neck at times, strongest for 5 minutes as if she was hit with something and then goes away  - No aura   - MRI Brain w wo contrast given worsening headache in patient over 44 yo    Vitamin D deficiency  - Vit D 25, repeat level at next visit  - Continue 1000u daily      Healthcare maintenance   labs (CBC, CMP, FLP, A1c, TSH, vitD): UTD  HIV/Hep panel/syphilis: UTD  Pneumococcal vaccine: at 65  Tdap: UTD 7/2023  Zoster vaccine: Dose 1 7/2023, Dose 2 11/2023  Flu: refused  FIT/colonoscopy: Cologuard ordered 7/2023  Lung cancer screening (LDCT age 50-80): N/A  Mammogram: MMG 8/2023: BI-RADS 1 Bilaterally  DEXA scan: 2 years after menopause, will need order in 12/2024  GYN (pap smears): Appt pending      Return to clinic in 3 months.  MRI Brain, new headaches      Bruna Phelps MD  [facility] Internal Medicine, PRG-2  2/22/2024

## 2024-02-23 NOTE — PROGRESS NOTES
Sylwia Kirk is a 54 y.o. female here for a diabetic eye screening with non-dilated fundus photos per Bruna Phelps MD.    Patient cooperative?: Yes  Small pupils?: No  Last eye exam: unknown    For exam results, see Encounter Report.

## 2024-03-03 NOTE — PROGRESS NOTES
Attending Addendum:   Patient seen and examined in clinic. Management and Plan were discussed with resident. Care was reasonable and necessary.   Nisa Mckinnon MD  [facility] - Internal Medicine

## 2024-03-14 ENCOUNTER — HOSPITAL ENCOUNTER (OUTPATIENT)
Dept: RADIOLOGY | Facility: HOSPITAL | Age: 54
Discharge: HOME | End: 2024-03-14
Attending: STUDENT IN AN ORGANIZED HEALTH CARE EDUCATION/TRAINING PROGRAM
Payer: MEDICAID

## 2024-03-14 DIAGNOSIS — R51.9 WORSENING HEADACHES: ICD-10-CM

## 2024-03-14 LAB
CREAT SERPL-MCNC: 0.84 MG/DL (ref 0.55–1.02)
GFR SERPLBLD CREATININE-BSD FMLA CKD-EPI: >60 MLS/MIN/1.73/M2

## 2024-03-14 PROCEDURE — 82565 ASSAY OF CREATININE: CPT | Performed by: STUDENT IN AN ORGANIZED HEALTH CARE EDUCATION/TRAINING PROGRAM

## 2024-03-14 PROCEDURE — 25500020 PHARM REV CODE 255

## 2024-03-14 PROCEDURE — 70553 MRI BRAIN STEM W/O & W/DYE: CPT | Mod: TC

## 2024-03-14 PROCEDURE — A9577 INJ MULTIHANCE: HCPCS

## 2024-03-14 RX ADMIN — GADOBENATE DIMEGLUMINE 18 ML: 529 INJECTION, SOLUTION INTRAVENOUS at 11:03

## 2024-08-02 ENCOUNTER — LAB VISIT (OUTPATIENT)
Dept: LAB | Facility: HOSPITAL | Age: 54
End: 2024-08-02
Attending: STUDENT IN AN ORGANIZED HEALTH CARE EDUCATION/TRAINING PROGRAM
Payer: MEDICAID

## 2024-08-02 DIAGNOSIS — E55.9 VITAMIN D DEFICIENCY: ICD-10-CM

## 2024-08-02 DIAGNOSIS — E11.9 TYPE 2 DIABETES MELLITUS WITHOUT COMPLICATION, WITHOUT LONG-TERM CURRENT USE OF INSULIN: ICD-10-CM

## 2024-08-02 LAB — 25(OH)D3+25(OH)D2 SERPL-MCNC: 42 NG/ML (ref 30–80)

## 2024-08-02 PROCEDURE — 83036 HEMOGLOBIN GLYCOSYLATED A1C: CPT

## 2024-08-02 PROCEDURE — 82306 VITAMIN D 25 HYDROXY: CPT

## 2024-08-02 PROCEDURE — 36415 COLL VENOUS BLD VENIPUNCTURE: CPT

## 2024-08-05 LAB
EST. AVERAGE GLUCOSE BLD GHB EST-MCNC: 188.6 MG/DL
HBA1C MFR BLD: 8.2 %

## 2024-08-06 RX ORDER — GLIPIZIDE 5 MG/1
5 TABLET ORAL 2 TIMES DAILY
COMMUNITY
Start: 2024-06-07

## 2024-08-08 ENCOUNTER — OFFICE VISIT (OUTPATIENT)
Dept: INTERNAL MEDICINE | Facility: CLINIC | Age: 54
End: 2024-08-08
Payer: MEDICAID

## 2024-08-08 VITALS
DIASTOLIC BLOOD PRESSURE: 72 MMHG | BODY MASS INDEX: 34.88 KG/M2 | OXYGEN SATURATION: 98 % | WEIGHT: 178.63 LBS | RESPIRATION RATE: 18 BRPM | SYSTOLIC BLOOD PRESSURE: 113 MMHG | HEART RATE: 79 BPM | TEMPERATURE: 98 F

## 2024-08-08 DIAGNOSIS — I10 HYPERTENSION, UNSPECIFIED TYPE: ICD-10-CM

## 2024-08-08 DIAGNOSIS — E78.5 HYPERLIPIDEMIA, UNSPECIFIED HYPERLIPIDEMIA TYPE: ICD-10-CM

## 2024-08-08 DIAGNOSIS — E11.9 TYPE 2 DIABETES MELLITUS WITHOUT COMPLICATION, WITHOUT LONG-TERM CURRENT USE OF INSULIN: Primary | ICD-10-CM

## 2024-08-08 DIAGNOSIS — G89.29 CHRONIC PAIN OF BOTH KNEES: ICD-10-CM

## 2024-08-08 DIAGNOSIS — M25.562 CHRONIC PAIN OF BOTH KNEES: ICD-10-CM

## 2024-08-08 DIAGNOSIS — Z12.12 SCREENING FOR COLORECTAL CANCER: ICD-10-CM

## 2024-08-08 DIAGNOSIS — R09.82 POST-NASAL DRIP: ICD-10-CM

## 2024-08-08 DIAGNOSIS — M25.561 CHRONIC PAIN OF BOTH KNEES: ICD-10-CM

## 2024-08-08 DIAGNOSIS — Z12.11 SCREENING FOR COLORECTAL CANCER: ICD-10-CM

## 2024-08-08 PROCEDURE — 99214 OFFICE O/P EST MOD 30 MIN: CPT | Mod: PBBFAC | Performed by: STUDENT IN AN ORGANIZED HEALTH CARE EDUCATION/TRAINING PROGRAM

## 2024-08-08 RX ORDER — CETIRIZINE HYDROCHLORIDE 10 MG/1
10 TABLET ORAL DAILY
Qty: 30 TABLET | Refills: 11 | Status: SHIPPED | OUTPATIENT
Start: 2024-08-08 | End: 2025-08-08

## 2024-08-08 RX ORDER — DICLOFENAC SODIUM 10 MG/G
2 GEL TOPICAL 4 TIMES DAILY
Qty: 50 G | Refills: 4 | Status: SHIPPED | OUTPATIENT
Start: 2024-08-08

## 2024-08-08 RX ORDER — SEMAGLUTIDE 0.68 MG/ML
0.5 INJECTION, SOLUTION SUBCUTANEOUS
Qty: 3 ML | Refills: 5 | Status: SHIPPED | OUTPATIENT
Start: 2024-08-08 | End: 2025-02-04

## 2024-08-08 RX ORDER — FLUTICASONE PROPIONATE 50 MCG
1 SPRAY, SUSPENSION (ML) NASAL DAILY
Qty: 16 G | Refills: 4 | Status: SHIPPED | OUTPATIENT
Start: 2024-08-08

## 2024-08-08 RX ORDER — METFORMIN HYDROCHLORIDE 1000 MG/1
1000 TABLET ORAL 2 TIMES DAILY WITH MEALS
Qty: 180 TABLET | Refills: 3 | Status: SHIPPED | OUTPATIENT
Start: 2024-08-08 | End: 2025-08-08

## 2024-08-09 ENCOUNTER — HOSPITAL ENCOUNTER (OUTPATIENT)
Dept: RADIOLOGY | Facility: HOSPITAL | Age: 54
Discharge: HOME | End: 2024-08-09
Attending: NURSE PRACTITIONER
Payer: MEDICAID

## 2024-08-09 DIAGNOSIS — Z12.31 BREAST CANCER SCREENING BY MAMMOGRAM: ICD-10-CM

## 2024-08-09 PROCEDURE — 77067 SCR MAMMO BI INCL CAD: CPT | Mod: TC

## 2024-08-09 PROCEDURE — 77067 SCR MAMMO BI INCL CAD: CPT | Mod: 26,,, | Performed by: STUDENT IN AN ORGANIZED HEALTH CARE EDUCATION/TRAINING PROGRAM

## 2024-08-09 PROCEDURE — 77063 BREAST TOMOSYNTHESIS BI: CPT | Mod: 26,,, | Performed by: STUDENT IN AN ORGANIZED HEALTH CARE EDUCATION/TRAINING PROGRAM

## 2024-08-12 RX ORDER — POLYETHYLENE GLYCOL 3350, SODIUM SULFATE, SODIUM CHLORIDE, POTASSIUM CHLORIDE, SODIUM ASCORBATE, AND ASCORBIC ACID 7.5-2.691G
KIT ORAL
Qty: 1 KIT | Refills: 0 | Status: SHIPPED | OUTPATIENT
Start: 2024-08-12

## 2024-10-11 ENCOUNTER — ANESTHESIA (OUTPATIENT)
Dept: ENDOSCOPY | Facility: HOSPITAL | Age: 54
End: 2024-10-11
Payer: MEDICAID

## 2024-10-11 ENCOUNTER — ANESTHESIA EVENT (OUTPATIENT)
Dept: ENDOSCOPY | Facility: HOSPITAL | Age: 54
End: 2024-10-11
Payer: MEDICAID

## 2024-10-11 ENCOUNTER — HOSPITAL ENCOUNTER (OUTPATIENT)
Facility: HOSPITAL | Age: 54
Discharge: HOME | End: 2024-10-11
Attending: INTERNAL MEDICINE | Admitting: INTERNAL MEDICINE
Payer: MEDICAID

## 2024-10-11 DIAGNOSIS — Z12.11 ENCOUNTER FOR COLORECTAL CANCER SCREENING: Primary | ICD-10-CM

## 2024-10-11 DIAGNOSIS — Z12.12 ENCOUNTER FOR COLORECTAL CANCER SCREENING: Primary | ICD-10-CM

## 2024-10-11 LAB — POCT GLUCOSE: 109 MG/DL (ref 70–110)

## 2024-10-11 PROCEDURE — 37000008 HC ANESTHESIA 1ST 15 MINUTES: Performed by: INTERNAL MEDICINE

## 2024-10-11 PROCEDURE — 45378 DIAGNOSTIC COLONOSCOPY: CPT | Performed by: INTERNAL MEDICINE

## 2024-10-11 PROCEDURE — 63600175 PHARM REV CODE 636 W HCPCS: Performed by: STUDENT IN AN ORGANIZED HEALTH CARE EDUCATION/TRAINING PROGRAM

## 2024-10-11 PROCEDURE — 82962 GLUCOSE BLOOD TEST: CPT | Performed by: INTERNAL MEDICINE

## 2024-10-11 PROCEDURE — 63600175 PHARM REV CODE 636 W HCPCS: Performed by: NURSE ANESTHETIST, CERTIFIED REGISTERED

## 2024-10-11 PROCEDURE — 37000009 HC ANESTHESIA EA ADD 15 MINS: Performed by: INTERNAL MEDICINE

## 2024-10-11 RX ORDER — LIDOCAINE HYDROCHLORIDE 20 MG/ML
INJECTION INTRAVENOUS
Status: DISCONTINUED | OUTPATIENT
Start: 2024-10-11 | End: 2024-10-11

## 2024-10-11 RX ORDER — LIDOCAINE HYDROCHLORIDE 10 MG/ML
1 INJECTION, SOLUTION EPIDURAL; INFILTRATION; INTRACAUDAL; PERINEURAL ONCE
OUTPATIENT
Start: 2024-10-11 | End: 2024-10-11

## 2024-10-11 RX ORDER — PROPOFOL 10 MG/ML
INJECTION, EMULSION INTRAVENOUS
Status: DISCONTINUED | OUTPATIENT
Start: 2024-10-11 | End: 2024-10-11

## 2024-10-11 RX ORDER — SODIUM CHLORIDE, SODIUM LACTATE, POTASSIUM CHLORIDE, CALCIUM CHLORIDE 600; 310; 30; 20 MG/100ML; MG/100ML; MG/100ML; MG/100ML
INJECTION, SOLUTION INTRAVENOUS ONCE
Status: COMPLETED | OUTPATIENT
Start: 2024-10-11 | End: 2024-10-11

## 2024-10-11 RX ADMIN — PROPOFOL 25 MG: 10 INJECTION, EMULSION INTRAVENOUS at 11:10

## 2024-10-11 RX ADMIN — SODIUM CHLORIDE, POTASSIUM CHLORIDE, SODIUM LACTATE AND CALCIUM CHLORIDE: 600; 310; 30; 20 INJECTION, SOLUTION INTRAVENOUS at 11:10

## 2024-10-11 RX ADMIN — LIDOCAINE HYDROCHLORIDE 50 MG: 20 INJECTION INTRAVENOUS at 11:10

## 2024-10-11 RX ADMIN — PROPOFOL 100 MG: 10 INJECTION, EMULSION INTRAVENOUS at 11:10

## 2024-10-11 NOTE — PROVATION PATIENT INSTRUCTIONS
Discharge Summary/Instructions after an Endoscopic Procedure  Patient Name: Sylwia Kirk  Patient MRN: [id number]  Patient YOB: 1970 Friday, October 11, 2024  RAJ Smith MD  Dear patient,  As a result of recent federal legislation (The Federal Cures Act), you may   receive lab or pathology results from your procedure in your [facility]   account before your physician is able to contact you. Your physician or   their representative will relay the results to you with their   recommendations at their soonest availability.  Thank you,  RESTRICTIONS:  During your procedure today, you received medications for sedation.  These   medications may affect your judgment, balance and coordination.  Therefore,   for 24 hours, you have the following restrictions:   - DO NOT drive a car, operate machinery, make legal/financial decisions,   sign important papers or drink alcohol.    ACTIVITY:  Today: no heavy lifting, straining or running due to procedural   sedation/anesthesia.  The following day: return to full activity including work.  DIET:  Eat and drink normally unless instructed otherwise.     TREATMENT FOR COMMON SIDE EFFECTS:  - Mild abdominal pain, nausea, belching, bloating or excessive gas:  rest,   eat lightly and use a heating pad.  - Sore Throat: treat with throat lozenges and/or gargle with warm salt   water.  - Because air was used during the procedure, expelling large amounts of air   from your rectum or belching is normal.  - If a bowel prep was taken, you may not have a bowel movement for 1-3 days.    This is normal.  SYMPTOMS TO WATCH FOR AND REPORT TO YOUR PHYSICIAN:  1. Abdominal pain or bloating, other than gas cramps.  2. Chest pain.  3. Back pain.  4. Signs of infection such as: chills or fever occurring within 24 hours   after the procedure.  5. Rectal bleeding, which would show as bright red, maroon, or black stools.   (A tablespoon of blood from the rectum is not serious, especially  if   hemorrhoids are present.)  6. Vomiting.  7. Weakness or dizziness.  GO DIRECTLY TO THE NEAREST EMERGENCY ROOM IF YOU HAVE ANY OF THE FOLLOWING:      Difficulty breathing              Chills and/or fever over 101 F   Persistent vomiting and/or vomiting blood   Severe abdominal pain   Severe chest pain   Black, tarry stools   Bleeding- more than one tablespoon   Any other symptom or condition that you feel may need urgent attention  Your doctor recommends these additional instructions:  If any biopsies were taken, your doctors clinic will contact you in 1 to 2   weeks with any results.  - Resume previous diet.   - Continue present medications.   - Discharge patient to home (via wheelchair).   - Repeat colonoscopy in 7-10 years for screening purposes.  For questions, problems or results please call your physician - RAJ Smith MD at Work:  [phone].  [facility] , EMERGENCY ROOM PHONE NUMBER: [phone]  IF A COMPLICATION OR EMERGENCY SITUATION ARISES AND YOU ARE UNABLE TO REACH   YOUR PHYSICIAN - GO DIRECTLY TO THE EMERGENCY ROOM.  RAJ Smith MD  10/11/2024 12:08:06 PM  This report has been verified and signed electronically.  Dear patient,  As a result of recent federal legislation (The Federal Cures Act), you may   receive lab or pathology results from your procedure in your [facility]   account before your physician is able to contact you. Your physician or   their representative will relay the results to you with their   recommendations at their soonest availability.  Thank you,  PROVATION

## 2024-10-11 NOTE — PLAN OF CARE
Patient awake and talking. No acute distress. Patient daughter at bedside helping patient to understand in Yoruba. Patient tolerating sips of coffee. Call bell in reach.

## 2024-10-11 NOTE — TRANSFER OF CARE
"Anesthesia Transfer of Care Note    Patient: Sylwia Kirk    Procedure(s) Performed: Procedure(s) (LRB):  COLONOSCOPY (N/A)    Patient location: GI    Anesthesia Type: general    Transport from OR: Transported from OR on room air with adequate spontaneous ventilation    Post pain: adequate analgesia    Post assessment: no apparent anesthetic complications and tolerated procedure well    Post vital signs: stable    Level of consciousness: sedated    Nausea/Vomiting: no nausea/vomiting    Complications: none    Transfer of care protocol was followedComments: Report to Laureano CARMEN    Last vitals: Visit Vitals  /60 (BP Location: Left arm, Patient Position: Lying)   Pulse 73   Temp 36.9 °C (98.4 °F) (Oral)   Resp 17   Ht 5' 1" (1.549 m)   Wt 76.2 kg (168 lb)   SpO2 95%   Breastfeeding No   BMI 31.74 kg/m²     "

## 2024-10-11 NOTE — H&P
History and Physical    Patient Name: Sylwia Kirk  MRN: [id number]  : 1970  Date of Procedure:  10/11/2024  Referring Physician: Bruna Phelps MD  Primary Physician: Bruna Phelps MD  Procedure Physician: Sal Smith MD    Procedure - Colonoscopy  ASA - per anesthesia  Mallampati - per anesthesia  History of Anesthesia problems - no  Family history Anesthesia problems -  no   Plan of anesthesia - General    Diagnosis: screening for colon cancer  Chief Complaint: Same as above    HPI: Patient is an 54 y.o. female w/ PMH HTN, DM is here for the above.   Patient denies any recent nausea, vomiting, abdominal pain, abnormal bowel movements, or unintentional weight loss. Denies any family history of colon cancer or polyps.   Last ate prior to midnight.  Denies taking blood thinners. Colonoscopy prep completed.   Last colonoscopy: never    ROS:  Constitutional: No fevers, chills, No weight loss  CV: No chest pain  Pulm: No cough, No shortness of breath  GI: see HPI    Medical History:   Past Medical History:   Diagnosis Date    Hyperlipidemia 2023    Hypertension 2023    Type 2 diabetes mellitus without complication, without long-term current use of insulin 2023     Surgical History:   Past Surgical History:   Procedure Laterality Date    Uterine biopsy       Family History:   No family history on file..  Social History:   Social History     Socioeconomic History    Marital status: Single    Number of children: 3   Tobacco Use    Smoking status: Never    Smokeless tobacco: Never   Substance and Sexual Activity    Alcohol use: Never    Drug use: Never    Sexual activity: Not Currently     Social Drivers of Health     Financial Resource Strain: Medium Risk (2024)    Overall Financial Resource Strain (CARDIA)     Difficulty of Paying Living Expenses: Somewhat hard   Food Insecurity: No Food Insecurity (2024)    Hunger Vital Sign     Worried About Running Out of Food in the Last Year:  Never true     Ran Out of Food in the Last Year: Never true   Transportation Needs: No Transportation Needs (2/22/2024)    PRAPARE - Transportation     Lack of Transportation (Medical): No     Lack of Transportation (Non-Medical): No   Physical Activity: Insufficiently Active (2/22/2024)    Exercise Vital Sign     Days of Exercise per Week: 5 days     Minutes of Exercise per Session: 20 min   Stress: No Stress Concern Present (2/22/2024)    [employer] - Occupational Stress Questionnaire     Feeling of Stress : Not at all   Housing Stability: Unknown (2/22/2024)    Housing Stability Vital Sign     Unable to Pay for Housing in the Last Year: No     Unstable Housing in the Last Year: No       Review of patient's allergies indicates:  No Known Allergies    Medications:   Medications Prior to Admission   Medication Sig Dispense Refill Last Dose/Taking    amLODIPine (NORVASC) 10 MG tablet Take 1 tablet (10 mg total) by mouth once daily. 30 tablet 11 10/11/2024    cholecalciferol, vitamin D3, (VITAMIN D3) 25 mcg (1,000 unit) capsule Take 1 capsule (1,000 Units total) by mouth once daily. 90 capsule 3 Past Month    diclofenac sodium (VOLTAREN ARTHRITIS PAIN) 1 % Gel Apply 2 g topically 4 (four) times daily. 50 g 4 Past Month    fenofibrate (TRICOR) 145 MG tablet Take 1 tablet (145 mg total) by mouth once daily. 30 tablet 11 10/10/2024 Morning    hydroCHLOROthiazide (HYDRODIURIL) 25 MG tablet Take 1 tablet (25 mg total) by mouth once daily. 30 tablet 11 10/11/2024    lisinopriL (PRINIVIL,ZESTRIL) 40 MG tablet Take 1 tablet (40 mg total) by mouth once daily. 30 tablet 11 10/11/2024    metFORMIN (GLUCOPHAGE) 1000 MG tablet Take 1 tablet (1,000 mg total) by mouth 2 (two) times daily with meals. 180 tablet 3 10/10/2024 Morning    polyethylene glycol (MOVIPREP) 100-7.5-2.691 gram solution Take as directed prior to colonoscopy 1 kit 0 10/11/2024    semaglutide (OZEMPIC) 0.25 mg or 0.5 mg (2 mg/3 mL)  "pen injector Inject 0.5 mg into the skin every 7 days. 3 mL 5 10/4/2024    blood sugar diagnostic Strp 1 strip by Misc.(Non-Drug; Combo Route) route once daily. 30 strip 11     cetirizine (ZYRTEC) 10 MG tablet Take 1 tablet (10 mg total) by mouth once daily. 30 tablet 11 More than a month    doxepin (SINEQUAN) 10 MG capsule Take 1 capsule (10 mg total) by mouth every evening. 30 capsule 11 More than a month    fluticasone propionate (FLONASE) 50 mcg/actuation nasal spray 1 spray (50 mcg total) by Each Nostril route once daily. 16 g 4 More than a month    hydrOXYzine pamoate (VISTARIL) 25 MG Cap Take 1 capsule (25 mg total) by mouth nightly as needed. 30 capsule 11 More than a month    meclizine (ANTIVERT) 25 mg tablet Take 1 tablet (25 mg total) by mouth 3 (three) times daily as needed for Dizziness. 21 tablet 0     ONETOUCH DELICA PLUS LANCET 33 gauge Misc Apply 1 lancet  topically 2 (two) times daily. 100 each 3     ONETOUCH ULTRA2 METER Misc 1 each by skin prick route 2 (two) times daily.       triamcinolone acetonide 0.5% (KENALOG) 0.5 % Crea Apply topically 2 (two) times daily. (Patient not taking: Reported on 2/22/2024) 15 g 0 Unknown     Physical Exam:  Vital Signs:   Vitals:    10/11/24 1050   BP: 109/60   Pulse: 73   Resp: 17   Temp: 98.4 °F (36.9 °C)     /60 (BP Location: Left arm, Patient Position: Lying)   Pulse 73   Temp 98.4 °F (36.9 °C) (Oral)   Resp 17   Ht 5' 1" (1.549 m)   Wt 76.2 kg (168 lb)   SpO2 95%   Breastfeeding No   BMI 31.74 kg/m²     General: NAD  Lungs: NWOB on RA. Symmetric chest rise and fall  Heart: RR  Abdomen: Soft, NT, ND    Labs:  Lab Results   Component Value Date    WBC 6.54 06/09/2023    HGB 13.0 06/09/2023    HCT 39.2 06/09/2023    MCV 85.2 06/09/2023     06/09/2023     No results found for: "INR", "PT", "APTT"  Lab Results   Component Value Date     06/09/2023    K 4.0 06/09/2023    CO2 29 06/09/2023    BUN 23.7 (H) 06/09/2023    CREATININE 0.84 " 03/14/2024    LABPROT 7.1 06/09/2023    ALBUMIN 4.1 06/09/2023    BILITOT 0.2 06/09/2023    ALKPHOS 57 06/09/2023    ALT 28 06/09/2023    AST 17 06/09/2023       Assessment and Plan:   Patient is a 54F with PMH of HTN, DM who presents today for colonoscopy.     - After discussing risks, benefits, and alternatives, patient elects to proceed with colonoscopy, and any other indicated procedures. All questions and concerns addressed. Written and verbal consent obtained.     Beverley Figueroa MD  [employer] General Surgery, PGY2

## 2024-10-11 NOTE — ANESTHESIA POSTPROCEDURE EVALUATION
Anesthesia Post Evaluation    Patient: Sylwia Kirk    Procedure(s) Performed: Procedure(s) (LRB):  COLONOSCOPY (N/A)    Final Anesthesia Type: general      Patient location during evaluation: GI PACU  Patient participation: No - Unable to Participate, Sedation  Level of consciousness: sedated  Post-procedure vital signs: reviewed and stable  Pain management: adequate  Airway patency: patent    PONV status at discharge: No PONV  Anesthetic complications: no      Cardiovascular status: hemodynamically stable  Respiratory status: unassisted, spontaneous ventilation and face mask  Hydration status: euvolemic  Follow-up not needed.  Comments: 91/52 p 68 r 15 t 36 sat 97          Vitals Value Taken Time   /60 10/11/24 1050   Temp 36.9 °C (98.4 °F) 10/11/24 1050   Pulse 73 10/11/24 1050   Resp 17 10/11/24 1050   SpO2 95 % 10/11/24 1050         No case tracking events are documented in the log.      Pain/Manju Score: No data recorded

## 2024-10-11 NOTE — PLAN OF CARE
Patient states ready to dress. Patient and daughter spoke with Dr. Smith for results. Assisted up to side of bed to dress. Patient able to dress self.

## 2024-10-11 NOTE — DISCHARGE SUMMARY
"[facility]  DISCHARGE SUMMARY  General Surgery      Admit Date:  10/11/2024    Discharge Date and Time:  10/11/2024  12:00 PM    Attending Physician:  REGIS Smith MD     Discharge Provider:  Beverley Figueroa MD     Reason for Admission:  Encounter for colorectal cancer screening     Procedures Performed:  Procedure(s) (LRB):  COLONOSCOPY (N/A)    Hospital Course:  Sylwia Kirk is a 54F w/ PMH HTN, DM who presnted for a colonoscopy. Please see operative report for full details.The pt was tolerating regular diet, without nausea/vomiting. Pt ambulating independently, pain well controlled. Pt deemed appropriate for discharge with appropriate follow-up.     Consults:  none    Physical Exam:  General: well developed, well nourished, no distress  HEENT: NCAT, EOMI  Neck: supple, symmetrical  Lungs: Normal WOB, No SOB  Cardiovascular: regular rate  Abdomen: soft, nondistended, nontender to palpation  Skin: Skin color, texture, turgor normal. No rashes or lesions  Musculoskeletal:no clubbing, cyanosis, no deformities  Neurologic: No focal numbness or weakness    Significant Diagnostic Studies:   No results for input(s): "WBC", "HGB", "HCT", "PLT", "BAND", "METAMYELOCYT", "MYELOPCT", "HGBA1C" in the last 72 hours.No results for input(s): "NA", "K", "CL", "CO2", "BUN", "CREATININE", "GLU", "CALCIUM", "CAION", "MG", "PHOS", "AST", "ALT", "ALKPHOS", "BILITOT", "BILIDIR", "PROT", "ALBUMIN", "PREALBUMIN", "AMYLASE", "LIPASE", "CRP", "HSCRP", "SEDRATE", "PROCAL" in the last 72 hours.No results for input(s): "INR", "PTT", "LABHEPA", "LACTATE", "TROPONINI", "CPK", "CPKMB", "MB", "BNP" in the last 72 hours.No results for input(s): "PH", "PCO2", "PO2", "HCO3" in the last 72 hours.      Final Diagnoses:   Principal Problem:  Encounter for colorectal cancer screening   Secondary Diagnoses:    Active Hospital Problems    Diagnosis  POA    *Encounter for colorectal cancer screening [Z12.11, Z12.12]  Not Applicable "      Resolved Hospital Problems   No resolved problems to display.       Discharged Condition:  good    Disposition:  Home or Self Care     Follow Up/Patient Instructions:   - follow up with PCP    Medications:  Reconciled Home Medications:    Current Discharge Medication List        CONTINUE these medications which have NOT CHANGED    Details   amLODIPine (NORVASC) 10 MG tablet Take 1 tablet (10 mg total) by mouth once daily.  Qty: 30 tablet, Refills: 11    Comments: .      cholecalciferol, vitamin D3, (VITAMIN D3) 25 mcg (1,000 unit) capsule Take 1 capsule (1,000 Units total) by mouth once daily.  Qty: 90 capsule, Refills: 3    Associated Diagnoses: Vitamin D deficiency      diclofenac sodium (VOLTAREN ARTHRITIS PAIN) 1 % Gel Apply 2 g topically 4 (four) times daily.  Qty: 50 g, Refills: 4    Associated Diagnoses: Chronic pain of both knees      fenofibrate (TRICOR) 145 MG tablet Take 1 tablet (145 mg total) by mouth once daily.  Qty: 30 tablet, Refills: 11      hydroCHLOROthiazide (HYDRODIURIL) 25 MG tablet Take 1 tablet (25 mg total) by mouth once daily.  Qty: 30 tablet, Refills: 11    Comments: .      lisinopriL (PRINIVIL,ZESTRIL) 40 MG tablet Take 1 tablet (40 mg total) by mouth once daily.  Qty: 30 tablet, Refills: 11    Comments: .      metFORMIN (GLUCOPHAGE) 1000 MG tablet Take 1 tablet (1,000 mg total) by mouth 2 (two) times daily with meals.  Qty: 180 tablet, Refills: 3    Associated Diagnoses: Type 2 diabetes mellitus without complication, without long-term current use of insulin      polyethylene glycol (MOVIPREP) 100-7.5-2.691 gram solution Take as directed prior to colonoscopy  Qty: 1 kit, Refills: 0      semaglutide (OZEMPIC) 0.25 mg or 0.5 mg (2 mg/3 mL) pen injector Inject 0.5 mg into the skin every 7 days.  Qty: 3 mL, Refills: 5    Associated Diagnoses: Type 2 diabetes mellitus without complication, without long-term current use of insulin      blood sugar diagnostic Strp 1 strip by  Misc.(Non-Drug; Combo Route) route once daily.  Qty: 30 strip, Refills: 11    Associated Diagnoses: Type 2 diabetes mellitus without complication, without long-term current use of insulin      cetirizine (ZYRTEC) 10 MG tablet Take 1 tablet (10 mg total) by mouth once daily.  Qty: 30 tablet, Refills: 11    Associated Diagnoses: Post-nasal drip      doxepin (SINEQUAN) 10 MG capsule Take 1 capsule (10 mg total) by mouth every evening.  Qty: 30 capsule, Refills: 11      fluticasone propionate (FLONASE) 50 mcg/actuation nasal spray 1 spray (50 mcg total) by Each Nostril route once daily.  Qty: 16 g, Refills: 4    Associated Diagnoses: Post-nasal drip      hydrOXYzine pamoate (VISTARIL) 25 MG Cap Take 1 capsule (25 mg total) by mouth nightly as needed.  Qty: 30 capsule, Refills: 11      meclizine (ANTIVERT) 25 mg tablet Take 1 tablet (25 mg total) by mouth 3 (three) times daily as needed for Dizziness.  Qty: 21 tablet, Refills: 0      ONETOUCH DELICA PLUS LANCET 33 gauge Misc Apply 1 lancet  topically 2 (two) times daily.  Qty: 100 each, Refills: 3      ONETOUCH ULTRA2 METER Misc 1 each by skin prick route 2 (two) times daily.      triamcinolone acetonide 0.5% (KENALOG) 0.5 % Crea Apply topically 2 (two) times daily.  Qty: 15 g, Refills: 0           Discharge Procedure Orders   Diet Adult Regular     Notify your health care provider if you experience any of the following:  temperature >100.4     Notify your health care provider if you experience any of the following:  persistent nausea and vomiting or diarrhea     Notify your health care provider if you experience any of the following:  severe uncontrolled pain     No dressing needed     Activity as tolerated      Follow-up Information       Bruna Phelps MD Follow up in 1 month(s).    Specialty: Internal Medicine  Contact information:  [address] [facility] [address]  [phone]                             Beverley Figueroa MD

## 2024-10-11 NOTE — ANESTHESIA PREPROCEDURE EVALUATION
10/11/2024  Sylwia Kirk is a 54 y.o., female with HTN, left renal artery stenosis, DM (on Ozempic), vertigo     Last dose of Ozempic last Friday, 10/4/24    7/13/23 TTE    Left Ventricle: The left ventricle is normal in size. Normal wall thickness. Normal wall motion. There is hyperdynamic systolic function with a visually estimated ejection fraction of greater than 70%. Biplane (2D) method of discs ejection fraction is 72%. There is normal diastolic function.    Right Ventricle: Systolic function is normal.    IVC/SVC: Normal venous pressure at 3 mmHg.      Pre-op Assessment    I have reviewed the Patient Summary Reports.     I have reviewed the Nursing Notes. I have reviewed the NPO Status.      Review of Systems  Anesthesia Hx:  No problems with previous Anesthesia               Denies Personal Hx of Anesthesia complications.                    EENT/Dental:   Vertigo           Cardiovascular:     Hypertension                                        Pulmonary:  Pulmonary Normal                       Renal/:     Left renal artery stenosis              Hepatic/GI:     GERD, well controlled             Neurological:  Neurology Normal                                      Endocrine:  Diabetes, type 2               Physical Exam  General: Well nourished, Cooperative, Alert and Oriented    Airway:  Mallampati: III   Mouth Opening: Normal  TM Distance: Normal  Tongue: Normal  Neck ROM: Normal ROM    Dental:  Intact        Anesthesia Plan  Type of Anesthesia, risks & benefits discussed:    Anesthesia Type: Gen Natural Airway  Intra-op Monitoring Plan: Standard ASA Monitors  Induction:  IV  Informed Consent: Informed consent signed with the Patient and all parties understand the risks and agree with anesthesia plan.  All questions answered.   ASA Score: 3  Day of Surgery Review of History & Physical: H&P Update  referred to the surgeon/provider.    Ready For Surgery From Anesthesia Perspective.     .

## 2024-10-14 VITALS
WEIGHT: 168 LBS | RESPIRATION RATE: 18 BRPM | DIASTOLIC BLOOD PRESSURE: 71 MMHG | SYSTOLIC BLOOD PRESSURE: 102 MMHG | HEART RATE: 75 BPM | TEMPERATURE: 98 F | BODY MASS INDEX: 31.72 KG/M2 | HEIGHT: 61 IN | OXYGEN SATURATION: 97 %

## 2024-10-28 ENCOUNTER — TELEPHONE (OUTPATIENT)
Dept: INTERNAL MEDICINE | Facility: CLINIC | Age: 54
End: 2024-10-28
Payer: MEDICAID

## 2024-10-28 ENCOUNTER — OFFICE VISIT (OUTPATIENT)
Dept: URGENT CARE | Facility: CLINIC | Age: 54
End: 2024-10-28
Payer: MEDICAID

## 2024-10-28 VITALS
WEIGHT: 171.81 LBS | RESPIRATION RATE: 18 BRPM | BODY MASS INDEX: 32.44 KG/M2 | DIASTOLIC BLOOD PRESSURE: 81 MMHG | HEART RATE: 74 BPM | HEIGHT: 61 IN | TEMPERATURE: 98 F | SYSTOLIC BLOOD PRESSURE: 120 MMHG | OXYGEN SATURATION: 99 %

## 2024-10-28 DIAGNOSIS — H00.012 HORDEOLUM EXTERNUM OF RIGHT LOWER EYELID: Primary | ICD-10-CM

## 2024-10-28 PROCEDURE — 99203 OFFICE O/P NEW LOW 30 MIN: CPT | Mod: S$PBB,,, | Performed by: FAMILY MEDICINE

## 2024-10-28 PROCEDURE — 99215 OFFICE O/P EST HI 40 MIN: CPT | Mod: PBBFAC | Performed by: FAMILY MEDICINE

## 2024-10-28 RX ORDER — GLIPIZIDE 5 MG/1
5 TABLET ORAL 2 TIMES DAILY
COMMUNITY
Start: 2024-10-15

## 2024-10-28 RX ORDER — ERYTHROMYCIN 5 MG/G
OINTMENT OPHTHALMIC EVERY 6 HOURS
Qty: 3.5 G | Refills: 1 | Status: SHIPPED | OUTPATIENT
Start: 2024-10-28 | End: 2024-11-04

## 2024-10-28 RX ORDER — ATORVASTATIN CALCIUM 40 MG/1
40 TABLET, FILM COATED ORAL
COMMUNITY
Start: 2024-10-15

## 2024-11-05 ENCOUNTER — OFFICE VISIT (OUTPATIENT)
Dept: URGENT CARE | Facility: CLINIC | Age: 54
End: 2024-11-05
Payer: MEDICAID

## 2024-11-05 VITALS
RESPIRATION RATE: 18 BRPM | OXYGEN SATURATION: 98 % | WEIGHT: 170 LBS | TEMPERATURE: 98 F | DIASTOLIC BLOOD PRESSURE: 83 MMHG | SYSTOLIC BLOOD PRESSURE: 127 MMHG | HEART RATE: 98 BPM | HEIGHT: 61 IN | BODY MASS INDEX: 32.1 KG/M2

## 2024-11-05 DIAGNOSIS — R39.89 BLADDER PAIN: Primary | ICD-10-CM

## 2024-11-05 DIAGNOSIS — R82.90 ABNORMAL URINALYSIS: ICD-10-CM

## 2024-11-05 DIAGNOSIS — R30.0 DYSURIA: ICD-10-CM

## 2024-11-05 LAB
BILIRUB UR QL STRIP: NEGATIVE
GLUCOSE SERPL-MCNC: 96 MG/DL (ref 70–110)
GLUCOSE UR QL STRIP: NEGATIVE
KETONES UR QL STRIP: POSITIVE
LEUKOCYTE ESTERASE UR QL STRIP: NEGATIVE
PH, POC UA: 6
POC BLOOD, URINE: POSITIVE
POC NITRATES, URINE: NEGATIVE
PROT UR QL STRIP: POSITIVE
SP GR UR STRIP: 1.02 (ref 1–1.03)
UROBILINOGEN UR STRIP-ACNC: 0.2 (ref 0.1–1.1)

## 2024-11-05 PROCEDURE — 81003 URINALYSIS AUTO W/O SCOPE: CPT | Mod: PBBFAC

## 2024-11-05 PROCEDURE — 87086 URINE CULTURE/COLONY COUNT: CPT

## 2024-11-05 PROCEDURE — 82962 GLUCOSE BLOOD TEST: CPT | Mod: PBBFAC

## 2024-11-05 PROCEDURE — 99215 OFFICE O/P EST HI 40 MIN: CPT | Mod: PBBFAC

## 2024-11-05 PROCEDURE — 99213 OFFICE O/P EST LOW 20 MIN: CPT | Mod: S$PBB,,,

## 2024-11-05 RX ORDER — OXYBUTYNIN CHLORIDE 5 MG/1
5 TABLET ORAL 3 TIMES DAILY PRN
Qty: 15 TABLET | Refills: 0 | Status: SHIPPED | OUTPATIENT
Start: 2024-11-05 | End: 2024-11-10

## 2024-11-05 NOTE — PROGRESS NOTES
"Subjective:       Patient ID: Sylwia Kirk is a 54 y.o. female.    Vitals:  height is 5' 1" (1.549 m) and weight is 77.1 kg (170 lb). Her oral temperature is 97.9 °F (36.6 °C). Her blood pressure is 127/83 and her pulse is 98. Her respiration is 18 and oxygen saturation is 98%.     Chief Complaint: Dysuria (Pain when urinating x 1 week.)    55 y/o  female presents to clinic with daughter, she reports urinary  symptoms x 1 week which has worsened, has taken AZO with no improvement, denies STD concerns. No LMP recorded. Patient is perimenopausal. Last BM on yesterday normal.           Constitution: Negative for chills and fever.   Gastrointestinal:  Positive for abdominal pain.   Genitourinary:  Positive for dysuria and urgency. Negative for vaginal pain and vaginal discharge.       Objective:      Physical Exam   Constitutional: She is oriented to person, place, and time. She is cooperative. She is easily aroused.  Non-toxic appearance. She does not appear ill. overweightawake  HENT:   Head: Normocephalic and atraumatic.   Eyes: Conjunctivae and lids are normal.   Pulmonary/Chest: Effort normal and breath sounds normal.   Abdominal: Normal appearance and bowel sounds are normal. Soft. flat abdomen There is abdominal tenderness in the suprapubic area. There is no left CVA tenderness and no right CVA tenderness.   Genitourinary:         Comments: deferred     Neurological: She is alert, oriented to person, place, and time and easily aroused. GCS eye subscore is 4. GCS verbal subscore is 5. GCS motor subscore is 6.   Skin: Skin is warm, dry, intact and not diaphoretic. Capillary refill takes less than 2 seconds.   Psychiatric: Her speech is normal and behavior is normal.   Nursing note and vitals reviewed.        Assessment:       1. Bladder pain    2. Dysuria    3. Abnormal urinalysis          Plan:     Will send UA for culture, staff we will contact patient with any abnormal findings.  Encouraged patient " to drink at least 64 oz of water daily, void often, adhere to good perineal hygiene.  Patient voiced no improvement with a AZO, we will give few days of oxybutynin for bladder spasm pain.  Strict ER precautions discussed, patient and daughter verbalizes understanding.    Bladder pain  -     oxybutynin (DITROPAN) 5 MG Tab; Take 1 tablet (5 mg total) by mouth 3 (three) times daily as needed (bladders spams).  Dispense: 15 tablet; Refill: 0    Dysuria  -     POCT Urinalysis, Dipstick, Automated, W/O Scope  -     POCT Glucose, Hand-Held Device    Abnormal urinalysis  -     Urine culture           Results for orders placed or performed in visit on 11/05/24   POCT Urinalysis, Dipstick, Automated, W/O Scope    Collection Time: 11/05/24  1:29 PM   Result Value Ref Range    POC Blood, Urine Positive (A) Negative    POC Bilirubin, Urine Negative Negative    POC Urobilinogen, Urine 0.2 0.1 - 1.1    POC Ketones, Urine Positive (A) Negative    POC Protein, Urine Positive (A) Negative    POC Nitrates, Urine Negative Negative    POC Glucose, Urine Negative Negative    pH, UA 6.0     POC Specific Gravity, Urine 1.025 1.003 - 1.029    POC Leukocytes, Urine Negative Negative   POCT Glucose, Hand-Held Device    Collection Time: 11/05/24  1:47 PM   Result Value Ref Range    POC Glucose 96 70 - 110 MG/DL        Medical Decision Making:   Differential Diagnosis:   Interstitial cystitis, dehydration, UTI, among others

## 2024-11-07 LAB — BACTERIA UR CULT: NO GROWTH

## 2025-01-17 RX ORDER — HYDROCHLOROTHIAZIDE 25 MG/1
25 TABLET ORAL DAILY
Qty: 30 TABLET | Refills: 11 | Status: SHIPPED | OUTPATIENT
Start: 2025-01-17 | End: 2026-01-17

## 2025-01-17 RX ORDER — AMLODIPINE BESYLATE 10 MG/1
10 TABLET ORAL DAILY
Qty: 30 TABLET | Refills: 11 | Status: SHIPPED | OUTPATIENT
Start: 2025-01-17 | End: 2026-01-17

## 2025-01-17 RX ORDER — LISINOPRIL 40 MG/1
40 TABLET ORAL DAILY
Qty: 30 TABLET | Refills: 11 | Status: SHIPPED | OUTPATIENT
Start: 2025-01-17 | End: 2026-01-17

## 2025-01-23 NOTE — PROGRESS NOTES
[facility] INTERNAL MEDICINE  OUTPATIENT OFFICE VISIT NOTE    SUBJECTIVE:      Chief Complaint: Follow-up       HPI: Sylwia Kirk is a 55 y.o. yo female w/ PMH of T2DM, HTN, HLD, and menopause, who presents for follow up.    Patient presents today complaining of nasal congestion and phlegm production for the psat 5 days. She deneis any fevers, cough, or chest pain during this time. She reports having some high glucose yesterday but reports it is more controlled today. She reports compliance with her home metformin and ozempic but noted that she has noted less effectiveness of her ozempic. Offered patient increasing dose but patient prefers to trial another medication to assist her more with her weight loss. Will trial patient on Mounjaro with uptitrating dose as  tolerated.     Past Medical History:  HTN, T2DM, HLD    Past Surgical History:  Colposcopy    Family History:  DM, HTN - mother, DM - siblings    Social History:  Tobacco use: Never  Alcohol use: Never  Drug use: Never    Allergies:  has No Known Allergies.     ROS:  (+) bilateral knee pain, post nasal drip  (-) chest pain, shortness of breath, edema, nausea, abdominal pain, diarrhea, consitpation     OBJECTIVE:     Vital signs:   There were no vitals taken for this visit.     Physical Examination:  General: Patient resting comfortably, in no acute distress   HEENT: Head-normocephalic and atraumatic  Respiratory: clear to auscultation bilaterally  Cardiovascular: regular rate without murmurs  Gastrointestinal: soft, non-tender, non-distended   Musculoskeletal: full range of motion without limitation or discomfort  Integumentary: no rashes or skin lesions present  Neurologic: no signs of peripheral neurological deficit  Psychiatric:  alert and oriented, cognitive function intact, cooperative with exam      Imaging:  US Doppler Renal Artery 11/29/2023  FINDINGS:  Right kidney measures 11.2 cm. Left kidney measures 11.7 cm.  No stones or hydronephrosis.   "Circumscribed slightly echogenic focus in the right renal sinus measures 2.0 x 1.7 cm.  Renal echogenicity is normal.  Renal veins are patent.  Intrarenal arterial resistive indices are normal.  Peak systolic velocity in the abdominal aorta measures 98 cm/sec.  Peak systolic velocity in the  right renal artery measures 98 cm/sec.  Peak systolic velocity in the  left renal artery measures 201 cm/sec.  Impression:  1. Elevated peak systolic velocity in the mid left renal artery suggests some degree of hemodynamically significant stenosis.  2. Echogenic focus in the right renal fat may represent an atypical pyramid or angiomyolipoma.    ASSESSMENT & PLAN:     Type 2 diabetes mellitus   - A1c 8.2 in 8/2024, from 7.2 in 2023  - DM foot exam 2/2024  - DM eye exam 2/22/2024: No diabetic retinopathy  - Previously discontinued glipizide 5 mg BID per patient request, no side effects but concerned about what she has read online, would prefer metformin  - Metformin 1000 mg BID  - Noted less effective weight loss and glucose control on ozempic; would like to trial Mounjaro; will order at this time  - Continue once daily glucose checks, glucose     Hypertension - well controlled  Left renal artery stenosis  - Renin 0.6, Aldosterone 4.2 in 11/2023  - US Renal 11/2023: "Elevated peak systolic velocity in the mid left renal artery suggests some degree of hemodynamically significant stenosis"  - Referred to Nephrology given renal artery stenosis  - Continue lisinopril 40 mg daily, HCTZ 25 mg daily, amlodipine 10mg daily    Vertigo - resolved  - Continue meclizine 25 mg TID PRN  - Orthostatics normal 7/2023  - TTE 8/2023: EF 70%, hyperdynamic systolic function, normal LV wall thcikness and motion, normal diastolic function, trace MV regurg, trace TV regurg, trace PV regurg    Menopause  Hot flashes  Dizziness  - GYN appt cancelled by pt in 4/2024; rescheduled 6/11/25   - Pap smear in May 2024 in [place], will obtain " records    Insomnia  - Previously used quetiapine 25 mg nightly  - Discontinue seroquel and doxepin due to side effects of dry moth  - Continue hydroxyzine prn as it works well    Headache  - Migraines twice in 1/2024, before would be once every two months  - Headache for years but worse in the last month, bilateral head and pain in right posterior neck at times, strongest for 5 minutes as if she was hit with something and then goes away  - No aura   - MRI Brain 3/2024: No acute intracranial findings identified     Vitamin D deficiency  - Vit D 42 (wnl) in 8/2024, previously 23.2 in 2023  - Continue 1000u daily    Post nasal drip with occasional cough  - Start zyrtec and flonase      Healthcare maintenance   labs (CBC, CMP, FLP, A1c, TSH, vitD): UTD  HIV/Hep panel/syphilis: UTD  Pneumococcal vaccine: at 65  Tdap: UTD 7/2023  Zoster vaccine: Dose 1 7/2023, Dose 2 11/2023  Flu: discuss next season   FIT/colonoscopy: Refer to Colonoscopy per patient request  Lung cancer screening (LDCT age 50-80): N/A  Mammogram: [facility] 8/2023: BI-RADS 1 Bilaterally  DEXA scan: 2 years after menopause, will need order in 12/2024  GYN (pap smears): Patient report pap smear in [place] in 5/2024, need to obtain records      Return to clinic in 4 months       Barrington Phan MD  [facility], -III  1/24/2025

## 2025-01-24 ENCOUNTER — OFFICE VISIT (OUTPATIENT)
Dept: INTERNAL MEDICINE | Facility: CLINIC | Age: 55
End: 2025-01-24
Payer: MEDICAID

## 2025-01-24 VITALS
TEMPERATURE: 98 F | SYSTOLIC BLOOD PRESSURE: 117 MMHG | RESPIRATION RATE: 18 BRPM | HEART RATE: 75 BPM | HEIGHT: 61 IN | BODY MASS INDEX: 33.61 KG/M2 | OXYGEN SATURATION: 96 % | WEIGHT: 178 LBS | DIASTOLIC BLOOD PRESSURE: 76 MMHG

## 2025-01-24 DIAGNOSIS — E66.9 OBESITY (BMI 30-39.9): ICD-10-CM

## 2025-01-24 DIAGNOSIS — E11.9 TYPE 2 DIABETES MELLITUS WITHOUT COMPLICATION, WITHOUT LONG-TERM CURRENT USE OF INSULIN: Primary | ICD-10-CM

## 2025-01-24 PROCEDURE — 99214 OFFICE O/P EST MOD 30 MIN: CPT | Mod: PBBFAC

## 2025-01-24 RX ORDER — TIRZEPATIDE 2.5 MG/.5ML
INJECTION, SOLUTION SUBCUTANEOUS
Qty: 6 ML | Refills: 0 | Status: SHIPPED | OUTPATIENT
Start: 2025-01-24 | End: 2025-03-21

## (undated) DEVICE — MANIFOLD 4 PORT

## (undated) DEVICE — KIT SURGICAL COLON .25 1.1OZ